# Patient Record
Sex: MALE | Race: WHITE | NOT HISPANIC OR LATINO | Employment: FULL TIME | URBAN - METROPOLITAN AREA
[De-identification: names, ages, dates, MRNs, and addresses within clinical notes are randomized per-mention and may not be internally consistent; named-entity substitution may affect disease eponyms.]

---

## 2023-04-18 PROBLEM — R12 HEARTBURN: Status: ACTIVE | Noted: 2023-04-18

## 2023-04-18 PROBLEM — R06.00 DYSPNEA: Status: ACTIVE | Noted: 2023-04-18

## 2023-04-18 PROBLEM — R53.82 CHRONIC FATIGUE: Status: ACTIVE | Noted: 2023-04-18

## 2023-04-18 PROBLEM — R06.83 SNORING: Status: ACTIVE | Noted: 2023-04-18

## 2023-04-18 PROBLEM — G89.29 CHRONIC LOW BACK PAIN: Status: ACTIVE | Noted: 2023-04-18

## 2023-04-18 PROBLEM — M54.50 CHRONIC LOW BACK PAIN: Status: ACTIVE | Noted: 2023-04-18

## 2023-04-18 PROBLEM — M25.569 CHRONIC KNEE PAIN: Status: ACTIVE | Noted: 2023-04-18

## 2023-04-18 PROBLEM — I10 ESSENTIAL HYPERTENSION: Status: ACTIVE | Noted: 2023-04-18

## 2023-04-18 PROBLEM — G89.29 CHRONIC KNEE PAIN: Status: ACTIVE | Noted: 2023-04-18

## 2023-04-18 RX ORDER — IBUPROFEN 200 MG
200 TABLET ORAL EVERY 6 HOURS PRN
COMMUNITY
End: 2023-04-19

## 2023-04-19 ENCOUNTER — CONSULT (OUTPATIENT)
Dept: BARIATRICS/WEIGHT MGMT | Facility: CLINIC | Age: 28
End: 2023-04-19
Payer: COMMERCIAL

## 2023-04-19 VITALS
TEMPERATURE: 98.1 F | WEIGHT: 311 LBS | DIASTOLIC BLOOD PRESSURE: 92 MMHG | SYSTOLIC BLOOD PRESSURE: 146 MMHG | HEIGHT: 69 IN | HEART RATE: 77 BPM | BODY MASS INDEX: 46.06 KG/M2

## 2023-04-19 DIAGNOSIS — G89.29 CHRONIC PAIN OF BOTH KNEES: ICD-10-CM

## 2023-04-19 DIAGNOSIS — M25.562 CHRONIC PAIN OF BOTH KNEES: ICD-10-CM

## 2023-04-19 DIAGNOSIS — M54.50 CHRONIC BILATERAL LOW BACK PAIN WITHOUT SCIATICA: ICD-10-CM

## 2023-04-19 DIAGNOSIS — R12 HEARTBURN: ICD-10-CM

## 2023-04-19 DIAGNOSIS — R06.83 SNORING: ICD-10-CM

## 2023-04-19 DIAGNOSIS — E66.01 MORBID OBESITY WITH BMI OF 45.0-49.9, ADULT: Primary | ICD-10-CM

## 2023-04-19 DIAGNOSIS — I10 ESSENTIAL HYPERTENSION: ICD-10-CM

## 2023-04-19 DIAGNOSIS — M25.561 CHRONIC PAIN OF BOTH KNEES: ICD-10-CM

## 2023-04-19 DIAGNOSIS — R53.82 CHRONIC FATIGUE: ICD-10-CM

## 2023-04-19 DIAGNOSIS — Z71.3 DIETARY COUNSELING: ICD-10-CM

## 2023-04-19 DIAGNOSIS — Z01.818 PREOPERATIVE TESTING: ICD-10-CM

## 2023-04-19 DIAGNOSIS — G89.29 CHRONIC BILATERAL LOW BACK PAIN WITHOUT SCIATICA: ICD-10-CM

## 2023-04-19 RX ORDER — CALCIUM CARBONATE 200(500)MG
1 TABLET,CHEWABLE ORAL AS NEEDED
COMMUNITY

## 2023-04-19 RX ORDER — NAPROXEN SODIUM 220 MG
220 TABLET ORAL
COMMUNITY
End: 2023-04-19

## 2023-04-19 NOTE — PROGRESS NOTES
"Metabolic and Bariatric Surgery Nutrition Assessment    Patient Name: Mark Keller    YOB: 1995   Age: 27 y.o.  Sex: male  MRN: [id number]     Assessment Date: 04/19/2023    Procedure considering: sleeve    Anthropometric Measurements  Height: 68.58\"          Current weight: 311 lbs   BMI: 46.49 kg/m²    Patient Goals and Expectations                        Patient's stated weight goal: 220-230 lbs  Reasons for desiring weight loss: quality of life, enjoy activities with child     Medical History  Pertinent diagnosis/problems: hypertension, back pain, knee pain     Weight History  Highest adult weight: 311 lbs                              Lowest adult weight: 230 lbs  Onset of obesity: childhood  Possible triggers or life events that may have led to weight gain: always been overweight     Previous Weight Loss Efforts  Patient has tried to lose weight in the past including calorie counting, high protein, low carbohydrate, keto, fasting and exercise.   Patient has lost up to 60 lbs on diets, but was unable to maintain this long term.     Diet History  Patient is eating 2-3 meals and 1-2 snacks daily.     24 Hour Recall  Breakfast: cereal or eggs and nava   Lunch: burger or may snack on chips, peanuts   Dinner: meat, vegetable, starch or fast food  Snacks: popcorn, deli meat, cheese, crackers    Beverages: water, tea, [employer], coffee, occasional soda     Eating out: 3-4 times per week   Vitamins/supplements: none   Diet restrictions or food allergies: NKFA    Patient identifies problem areas to be physical hunger, over consumption, eating large portions, grazing and inactivity.      Physical Activity  Work-related activity:    Planned exercise: none   Barriers to activity: knee pain and easily out of breath     Nutrition Intervention  Pre and post op nutrition education and coaching for behavior change completed. Program materials provided. Emphasized the importance of taking in at " least 70 g protein and 64 oz fluid daily. Discussed personal habits and lifestyle behaviors that may influence weight loss efforts. Self monitoring strategies such as keeping a food journal were encouraged. Patient verbalized understanding and questions were answered.  Short term goals: decrease/eliminate high calorie and carbonated beverages    Recommendations/Plan  Patient will be evaluated by multidisciplinary team before undergoing a review of their candidacy.  Additional nutrition counseling available and encouraged both pre and post op.   Patient demonstrated a good comprehension of diet requirements and commitment to make healthy changes.   Mark Keller appears to be a suitable candidate for bariatric surgery from a nutritional standpoint.      Prerna Gonsales RD  04/19/23  16:18 EDT

## 2023-04-19 NOTE — PROGRESS NOTES
[facility]RIC [facility]GERY  [address] [facility] [address]  [facility] [address]  [phone]  [address] [id number] [phone]  Dept: [phone]  4/19/2023      Mark Keller.  [id number]  [id number]  1995  male      Chief Complaint of weight gain; unable to maintain weight loss    History of Present Illness:   Mark is a 27 y.o. male who presents today for evaluation, education and consultation regarding weight loss surgery. The patient is interested in the sleeve gastrectomy.      Diet History:Mark has been overweight for at least 13 years, has been 35 pounds or more overweight for at least 13 years, has been 100 pounds or more overweight for 8 or more years and started dieting at age 23.  The most weight Mark lost was 70 pounds on Keto, calorie restriction, fasting and maintained the weight loss for a few months. Mark describes his eating habits as snacking between meals, drinking caffeine containing drinks, eating large meals, drinking soda. Mark Keller has tried Fasting, reduced calorie, exercising and Ketogenic or high fat among others with success of losing up to 70 pounds, but in each instance regained the weight.     See dietician documentation for complete history.    Bariatric Surgery Evaluation: The patient is being seen for an initial visit for bariatric surgery evaluation and education.     Bariatric Co-morbidities:  hypertension, back pain, knee pain and GERD    Patient Active Problem List   Diagnosis   • Chronic fatigue   • Essential hypertension   • Snoring   • Dyspnea   • Heartburn   • Chronic low back pain   • Chronic knee pain   • Morbid obesity with BMI of 45.0-49.9, adult   • Dietary counseling   • Preoperative testing       Past Medical History:   Diagnosis Date   • Chronic knee pain    • Chronic low back pain    • Heartburn    • Hypertension        Past Surgical History:   Procedure Laterality Date   •  APPENDECTOMY      open       No Known Allergies      Current Outpatient Medications:   •  calcium carbonate (TUMS) 500 MG chewable tablet, Chew 1 tablet As Needed., Disp: , Rfl:     Social History     Socioeconomic History   • Marital status: Single   Tobacco Use   • Smoking status: Former     Types: Cigarettes   • Smokeless tobacco: Former   Vaping Use   • Vaping Use: Never used   Substance and Sexual Activity   • Alcohol use: Yes     Comment: rare   • Drug use: Never   • Sexual activity: Defer       Family History   Problem Relation Age of Onset   • Obesity Mother    • Hypertension Father    • Heart attack Father    • Heart disease Father    • Obesity Maternal Grandmother    • Sleep apnea Maternal Grandmother    • Heart disease Maternal Grandfather    • Heart attack Maternal Grandfather    • Hypertension Maternal Grandfather    • Heart disease Paternal Grandmother    • Heart attack Paternal Grandmother    • Hypertension Paternal Grandmother    • Obesity Paternal Grandmother    • Diabetes Paternal Grandmother          Review of Systems:  Review of Systems   Constitutional: Positive for fatigue and unexpected weight change.   Respiratory: Negative for chest tightness and shortness of breath.    Cardiovascular: Negative for chest pain.   Gastrointestinal:        Heartburn   Musculoskeletal: Positive for arthralgias, back pain and myalgias.   All other systems reviewed and are negative.      Physical Exam:  Vital Signs:  Weight: (!) 141 kg (311 lb)   Body mass index is 46.49 kg/m².  Temp: 98.1 °F (36.7 °C)   Heart Rate: 77   BP: 146/92     Physical Exam  Vitals reviewed.   Constitutional:       General: He is not in acute distress.     Appearance: Normal appearance. He is morbidly obese.   HENT:      Head: Normocephalic and atraumatic.      Mouth/Throat:      Mouth: Mucous membranes are moist.   Eyes:      General: No scleral icterus.     Extraocular Movements: Extraocular movements intact.      Conjunctiva/sclera:  Conjunctivae normal.      Pupils: Pupils are equal, round, and reactive to light.   Cardiovascular:      Rate and Rhythm: Normal rate and regular rhythm.      Heart sounds: Normal heart sounds. No murmur heard.    No gallop.   Pulmonary:      Effort: Pulmonary effort is normal. No respiratory distress.      Breath sounds: Normal breath sounds.   Abdominal:      General: Bowel sounds are normal. There is no distension.      Palpations: Abdomen is soft. There is no mass.      Tenderness: There is no abdominal tenderness. There is no guarding.   Musculoskeletal:         General: Normal range of motion.      Cervical back: Normal range of motion and neck supple.   Skin:     General: Skin is warm and dry.   Neurological:      General: No focal deficit present.      Mental Status: He is alert and oriented to person, place, and time.   Psychiatric:         Mood and Affect: Mood normal.         Behavior: Behavior normal.         Thought Content: Thought content normal.         Judgment: Judgment normal.       Assessment:         Mark Keller is a 27 y.o. year old male with medically complicated severe obesity. Weight: (!) 141 kg (311 lb), Body mass index is 46.49 kg/m². and weight related problems including hypertension, back pain, knee pain and GERD.    I explained in detail, potential surgical options of interest to the patient including the RNY gastric bypass, sleeve gastrectomy, and gastric band while considering the patient's medical history. At this time, the patient expressed interest in the Laparoscopic Sleeve  All of those procedures can be performed laparoscopically but there is a chance to convert to open if any technical challenges or complications do occur.  Bariatric surgery is not cosmetic surgery but rather a tool to help a patient make a life-long commitment lifestyle changes including diet, exercise, behavior changes, and taking supplemental vitamins and minerals.    Due to the patient's BMI, history,  and co-morbidities related to potential surgical complications were evaluated. Due to Mark Keller's risk factors male will obtain the following prior to being scheduled for surgical intervention:    A letter of medical support as well as a history and physical must be obtained from his primary care provider. The patient was given a copy of a sample form, that will suffice as their letter to take to their primary are provider.    A referral for pre-operative psychological evaluation was ordered for the patient to evaluate candidacy as well as provide mental health support, should it be warranted before or after surgery.    Previous records were not available for review at this time.  Patient requests referral to internal medicine.   and  EKG, CXR, psychology clearance, CBC, CMP, Hba1c and TSHwere ordered at this time. These will be drawn and patient will be notified with results. Patient will complete new, pre-operative radiology prior to being scheduled for surgery.     Mark Keller was screened for sleep apnea in our office today and based on their results he is low risk. The risks, as they relate to chronic hypercapnia r/t untreated SARAHI were discussed with the patient and He verbalized understanding.     A pre-operative diagnostic UGI for evaluation will be ordered and scheduled for this patient.     As this patient is a male who is unable to become pregnant he was not counseled related to conception and pregnancy.     The risks, benefits, alternatives, and potential complications of all of the sleeve gastrectomy explained in detail including, but not limited to death, anesthesia and medication adverse effect/DVT, pulmonary embolism, trocar site/incisional hernia, wound infection, abdominal infection, bleeding, failure to lose weight or gain weight and change in body image, metabolic complications with calcium, thiamine, vitamin B12, folate, iron, and anemia.    The patient was advised to start a high  protein, low fat and low carbohydrate diet  start routine exercise including but not limited to 150 minutes per week. The patient received a resistance band along with a handout of exercises. The patient was given individualized information by our dietician along with handouts.     The patient was given information regarding the ARCENIO educational video. ARCENIO is an internet based educational video which explains the sourgical procedure and answers basic questions regarding the procedure. The patient was provided with instructions and a password to watch the video.    The patient understands the surgical procedures and the different surgical options that are available.  He understands the lifestyle changes that would be required after surgery and has agreed to participate in a pre-operative and postoperative weight management program.  He also expressed understanding of possible risks, had several questions answered and desires to proceed.    I think he is a good candidate for this surgery, and is interested in a sleeve gastrectomy.    Encounter Diagnoses   Name Primary?   • Morbid obesity with BMI of 45.0-49.9, adult Yes   • Essential hypertension    • Heartburn    • Chronic bilateral low back pain without sciatica    • Chronic pain of both knees    • Snoring    • Chronic fatigue    • Dietary counseling    • Preoperative testing        Plan:    Patient will be evaluated by a bariatric dietician psychologist before undergoing a multidisciplinary review of his candidacy. We discussed weight loss requirements prior to surgery and rationale, as well as other program requirements to ensure the safest approach to surgery. We spent time discussing different surgical procedures and plan of care throughout their lifespan to ensure long term success in achieving and maintaining a healthier weight. Patient will proceed with preoperative lab work, radiology, and endoscopy after obtaining agreed upon specialty, clearances, sleep  study, and letter of support from his primary care provider.    Total time spent during this encounter today was 60 minutes and includes preparing for the visit, reviewing tests, performing a medically appropriate examination and/or evaluations, counseling and educating the patient/family/caregiver, ordering medications, tests, or procedures, documenting information in the medical record and independently interpreting results and communicating that information with the patient/family/caregiver.    Tiffany Moreau, APRN  4/19/2023

## 2023-04-25 ENCOUNTER — PATIENT ROUNDING (BHMG ONLY) (OUTPATIENT)
Dept: FAMILY MEDICINE CLINIC | Facility: CLINIC | Age: 28
End: 2023-04-25
Payer: COMMERCIAL

## 2023-04-25 ENCOUNTER — OFFICE VISIT (OUTPATIENT)
Dept: FAMILY MEDICINE CLINIC | Facility: CLINIC | Age: 28
End: 2023-04-25
Payer: COMMERCIAL

## 2023-04-25 VITALS
BODY MASS INDEX: 46.02 KG/M2 | SYSTOLIC BLOOD PRESSURE: 137 MMHG | WEIGHT: 310.7 LBS | OXYGEN SATURATION: 98 % | HEIGHT: 69 IN | DIASTOLIC BLOOD PRESSURE: 98 MMHG | HEART RATE: 72 BPM

## 2023-04-25 DIAGNOSIS — Z00.00 ANNUAL PHYSICAL EXAM: ICD-10-CM

## 2023-04-25 DIAGNOSIS — E66.01 MORBID (SEVERE) OBESITY DUE TO EXCESS CALORIES: Primary | ICD-10-CM

## 2023-04-25 RX ORDER — OMEGA-3 FATTY ACIDS/FISH OIL 300-1000MG
1 CAPSULE ORAL AS NEEDED
COMMUNITY

## 2023-04-25 NOTE — PROGRESS NOTES
"Chief Complaint  Obesity (Setting up care for weight loss surgery)    Subjective        Mark Keller presents to [facility]  History of Present Illness  Establish care with PCP. Is going to have gastric sleeve placement and needs a PCP.   Obesity  This is a chronic problem. The current episode started more than 1 year ago. The problem occurs constantly. The problem has been gradually worsening. Associated symptoms include arthralgias and myalgias (sometimes). Pertinent negatives include no fatigue. Associated symptoms comments: Shortness of breath with exertion. Nothing aggravates the symptoms. Treatments tried: diet, exercise,  The treatment provided no relief.       I have reviewed the patient's medical history in detail and updated the computerized patient record.      Current Outpatient Medications:   •  calcium carbonate (TUMS) 500 MG chewable tablet, Chew 1 tablet As Needed., Disp: , Rfl:   •  Ibuprofen 200 MG capsule, Take 1 dose by mouth As Needed., Disp: , Rfl:        Objective   Vital Signs:  /98 (BP Location: Left arm, Patient Position: Sitting, Cuff Size: Adult)   Pulse 72   Ht 174.4 cm (68.66\")   Wt (!) 141 kg (310 lb 11.2 oz)   SpO2 98%   BMI 46.34 kg/m²   Estimated body mass index is 46.34 kg/m² as calculated from the following:    Height as of this encounter: 174.4 cm (68.66\").    Weight as of this encounter: 141 kg (310 lb 11.2 oz).       Class 3 Severe Obesity (BMI >=40). Obesity-related health conditions include the following: hypertension and arthralgias. Obesity is worsening. BMI is is above average; BMI management plan is completed. We discussed portion control and increasing exercise.      Physical Exam  Vitals reviewed.   Constitutional:       Appearance: Normal appearance.   Cardiovascular:      Rate and Rhythm: Normal rate and regular rhythm.      Pulses: Normal pulses.      Heart sounds: Normal heart sounds.   Pulmonary:      Effort: Pulmonary " effort is normal.      Breath sounds: Normal breath sounds.   Neurological:      Mental Status: He is alert and oriented to person, place, and time.   Psychiatric:      Comments: No acute distress        Result Review :                   Assessment and Plan   Diagnoses and all orders for this visit:    1. Morbid (severe) obesity due to excess calories (Primary)    Mr. Keller presents today to establish care.  He does not appear to be in any acute distress.  His physical exam is unremarkable.  I feel that he will benefit from gastric sleeve placement.          Follow Up   Return in about 6 months (around 10/25/2023), or if symptoms worsen or fail to improve, for Annual physical, Fasting labs 1 week prior to next f/u.  Patient was given instructions and counseling regarding his condition or for health maintenance advice. Please see specific information pulled into the AVS if appropriate.

## 2023-05-31 ENCOUNTER — HOSPITAL ENCOUNTER (OUTPATIENT)
Dept: GENERAL RADIOLOGY | Facility: HOSPITAL | Age: 28
Discharge: HOME | End: 2023-05-31

## 2023-05-31 ENCOUNTER — LAB (OUTPATIENT)
Dept: LAB | Facility: HOSPITAL | Age: 28
End: 2023-05-31

## 2023-05-31 ENCOUNTER — HOSPITAL ENCOUNTER (OUTPATIENT)
Dept: CARDIOLOGY | Facility: HOSPITAL | Age: 28
Discharge: HOME | End: 2023-05-31

## 2023-05-31 DIAGNOSIS — Z01.818 PREOPERATIVE TESTING: ICD-10-CM

## 2023-05-31 DIAGNOSIS — G89.29 CHRONIC BILATERAL LOW BACK PAIN WITHOUT SCIATICA: ICD-10-CM

## 2023-05-31 DIAGNOSIS — R53.82 CHRONIC FATIGUE: ICD-10-CM

## 2023-05-31 DIAGNOSIS — Z71.3 DIETARY COUNSELING: ICD-10-CM

## 2023-05-31 DIAGNOSIS — M25.561 CHRONIC PAIN OF BOTH KNEES: ICD-10-CM

## 2023-05-31 DIAGNOSIS — R06.83 SNORING: ICD-10-CM

## 2023-05-31 DIAGNOSIS — E66.01 MORBID OBESITY WITH BMI OF 45.0-49.9, ADULT: ICD-10-CM

## 2023-05-31 DIAGNOSIS — M25.562 CHRONIC PAIN OF BOTH KNEES: ICD-10-CM

## 2023-05-31 DIAGNOSIS — I10 ESSENTIAL HYPERTENSION: ICD-10-CM

## 2023-05-31 DIAGNOSIS — R12 HEARTBURN: ICD-10-CM

## 2023-05-31 DIAGNOSIS — G89.29 CHRONIC PAIN OF BOTH KNEES: ICD-10-CM

## 2023-05-31 DIAGNOSIS — M54.50 CHRONIC BILATERAL LOW BACK PAIN WITHOUT SCIATICA: ICD-10-CM

## 2023-05-31 DIAGNOSIS — Z00.00 ANNUAL PHYSICAL EXAM: ICD-10-CM

## 2023-05-31 LAB
ALBUMIN SERPL-MCNC: 5 G/DL (ref 3.5–5.2)
ALBUMIN/GLOB SERPL: 1.7 G/DL
ALP SERPL-CCNC: 62 U/L (ref 39–117)
ALT SERPL W P-5'-P-CCNC: 58 U/L (ref 1–41)
ANION GAP SERPL CALCULATED.3IONS-SCNC: 12.8 MMOL/L (ref 5–15)
AST SERPL-CCNC: 37 U/L (ref 1–40)
BASOPHILS # BLD AUTO: 0.07 10*3/MM3 (ref 0–0.2)
BASOPHILS NFR BLD AUTO: 1.1 % (ref 0–1.5)
BILIRUB SERPL-MCNC: 0.7 MG/DL (ref 0–1.2)
BUN SERPL-MCNC: 20 MG/DL (ref 6–20)
BUN/CREAT SERPL: 22.7 (ref 7–25)
CALCIUM SPEC-SCNC: 10 MG/DL (ref 8.6–10.5)
CHLORIDE SERPL-SCNC: 101 MMOL/L (ref 98–107)
CO2 SERPL-SCNC: 22.2 MMOL/L (ref 22–29)
CREAT SERPL-MCNC: 0.88 MG/DL (ref 0.76–1.27)
DEPRECATED RDW RBC AUTO: 41.8 FL (ref 37–54)
EGFRCR SERPLBLD CKD-EPI 2021: 120.1 ML/MIN/1.73
EOSINOPHIL # BLD AUTO: 0.36 10*3/MM3 (ref 0–0.4)
EOSINOPHIL NFR BLD AUTO: 5.5 % (ref 0.3–6.2)
ERYTHROCYTE [DISTWIDTH] IN BLOOD BY AUTOMATED COUNT: 13.9 % (ref 12.3–15.4)
GLOBULIN UR ELPH-MCNC: 2.9 GM/DL
GLUCOSE SERPL-MCNC: 92 MG/DL (ref 65–99)
HBA1C MFR BLD: 5.4 % (ref 4.8–5.6)
HCT VFR BLD AUTO: 49.2 % (ref 37.5–51)
HGB BLD-MCNC: 15.9 G/DL (ref 13–17.7)
IMM GRANULOCYTES # BLD AUTO: 0.03 10*3/MM3 (ref 0–0.05)
IMM GRANULOCYTES NFR BLD AUTO: 0.5 % (ref 0–0.5)
LYMPHOCYTES # BLD AUTO: 1.73 10*3/MM3 (ref 0.7–3.1)
LYMPHOCYTES NFR BLD AUTO: 26.5 % (ref 19.6–45.3)
MCH RBC QN AUTO: 26.6 PG (ref 26.6–33)
MCHC RBC AUTO-ENTMCNC: 32.3 G/DL (ref 31.5–35.7)
MCV RBC AUTO: 82.4 FL (ref 79–97)
MONOCYTES # BLD AUTO: 0.75 10*3/MM3 (ref 0.1–0.9)
MONOCYTES NFR BLD AUTO: 11.5 % (ref 5–12)
NEUTROPHILS NFR BLD AUTO: 3.6 10*3/MM3 (ref 1.7–7)
NEUTROPHILS NFR BLD AUTO: 54.9 % (ref 42.7–76)
NRBC BLD AUTO-RTO: 0 /100 WBC (ref 0–0.2)
PLATELET # BLD AUTO: 290 10*3/MM3 (ref 140–450)
PMV BLD AUTO: 9 FL (ref 6–12)
POTASSIUM SERPL-SCNC: 4.2 MMOL/L (ref 3.5–5.2)
PROT SERPL-MCNC: 7.9 G/DL (ref 6–8.5)
QT INTERVAL: 399 MS
RBC # BLD AUTO: 5.97 10*6/MM3 (ref 4.14–5.8)
SODIUM SERPL-SCNC: 136 MMOL/L (ref 136–145)
TSH SERPL DL<=0.05 MIU/L-ACNC: 2.14 UIU/ML (ref 0.27–4.2)
WBC NRBC COR # BLD: 6.54 10*3/MM3 (ref 3.4–10.8)

## 2023-05-31 PROCEDURE — 83036 HEMOGLOBIN GLYCOSYLATED A1C: CPT

## 2023-05-31 PROCEDURE — 93005 ELECTROCARDIOGRAM TRACING: CPT | Performed by: NURSE PRACTITIONER

## 2023-05-31 PROCEDURE — 80050 GENERAL HEALTH PANEL: CPT

## 2023-05-31 PROCEDURE — 83013 H PYLORI (C-13) BREATH: CPT | Performed by: NURSE PRACTITIONER

## 2023-05-31 PROCEDURE — 74246 X-RAY XM UPR GI TRC 2CNTRST: CPT

## 2023-05-31 PROCEDURE — 71046 X-RAY EXAM CHEST 2 VIEWS: CPT

## 2023-05-31 PROCEDURE — 36415 COLL VENOUS BLD VENIPUNCTURE: CPT

## 2023-05-31 RX ADMIN — BARIUM SULFATE 135 ML: 980 POWDER, FOR SUSPENSION ORAL at 09:30

## 2023-05-31 RX ADMIN — ANTACID/ANTIFLATULENT 1 PACKET: 380; 550; 10; 10 GRANULE, EFFERVESCENT ORAL at 09:30

## 2023-06-01 LAB — UREA BREATH TEST QL: NEGATIVE

## 2023-11-07 ENCOUNTER — TELEPHONE (OUTPATIENT)
Dept: BARIATRICS/WEIGHT MGMT | Facility: CLINIC | Age: 28
End: 2023-11-07
Payer: COMMERCIAL

## 2023-11-16 ENCOUNTER — TELEPHONE (OUTPATIENT)
Dept: BARIATRICS/WEIGHT MGMT | Facility: CLINIC | Age: 28
End: 2023-11-16
Payer: COMMERCIAL

## 2023-11-16 NOTE — TELEPHONE ENCOUNTER
Called and spoke to patient for update on his case and the approval process. Original request was denied but appeal has been sent to [facility] and we are waiting for a determination. I informed the patient that I would touch base with him as soon as I hear anything more about his case and apologized for the issues he has had getting in touch with our office. Patient had no additional questions at this time but was encouraged to contact me if he needed anything.

## 2023-12-21 ENCOUNTER — PREP FOR SURGERY (OUTPATIENT)
Dept: OTHER | Facility: HOSPITAL | Age: 28
End: 2023-12-21
Payer: COMMERCIAL

## 2023-12-21 DIAGNOSIS — E66.01 OBESITY, CLASS III, BMI 40-49.9 (MORBID OBESITY): Primary | ICD-10-CM

## 2023-12-21 PROBLEM — E66.813 OBESITY, CLASS III, BMI 40-49.9 (MORBID OBESITY): Status: ACTIVE | Noted: 2023-12-21

## 2023-12-21 RX ORDER — SCOLOPAMINE TRANSDERMAL SYSTEM 1 MG/1
1 PATCH, EXTENDED RELEASE TRANSDERMAL CONTINUOUS
OUTPATIENT
Start: 2023-12-21 | End: 2023-12-24

## 2023-12-21 RX ORDER — SODIUM CHLORIDE 0.9 % (FLUSH) 0.9 %
3 SYRINGE (ML) INJECTION EVERY 12 HOURS SCHEDULED
OUTPATIENT
Start: 2023-12-21

## 2023-12-21 RX ORDER — GABAPENTIN 300 MG/1
300 CAPSULE ORAL ONCE
OUTPATIENT
Start: 2023-12-21 | End: 2023-12-21

## 2023-12-21 RX ORDER — CHLORHEXIDINE GLUCONATE ORAL RINSE 1.2 MG/ML
15 SOLUTION DENTAL SEE ADMIN INSTRUCTIONS
OUTPATIENT
Start: 2023-12-21

## 2023-12-21 RX ORDER — PROMETHAZINE HYDROCHLORIDE 25 MG/1
25 SUPPOSITORY RECTAL ONCE AS NEEDED
OUTPATIENT
Start: 2023-12-21

## 2023-12-21 RX ORDER — PANTOPRAZOLE SODIUM 40 MG/10ML
40 INJECTION, POWDER, LYOPHILIZED, FOR SOLUTION INTRAVENOUS ONCE
OUTPATIENT
Start: 2023-12-21 | End: 2023-12-21

## 2023-12-21 RX ORDER — CEFAZOLIN SODIUM IN 0.9 % NACL 3 G/100 ML
3 INTRAVENOUS SOLUTION, PIGGYBACK (ML) INTRAVENOUS ONCE
OUTPATIENT
Start: 2023-12-21 | End: 2023-12-21

## 2023-12-21 RX ORDER — SODIUM CHLORIDE 9 MG/ML
40 INJECTION, SOLUTION INTRAVENOUS AS NEEDED
OUTPATIENT
Start: 2023-12-21

## 2023-12-21 RX ORDER — SODIUM CHLORIDE 0.9 % (FLUSH) 0.9 %
3-10 SYRINGE (ML) INJECTION AS NEEDED
OUTPATIENT
Start: 2023-12-21

## 2023-12-21 RX ORDER — METOCLOPRAMIDE HYDROCHLORIDE 5 MG/ML
10 INJECTION INTRAMUSCULAR; INTRAVENOUS ONCE
OUTPATIENT
Start: 2023-12-21 | End: 2023-12-21

## 2023-12-21 RX ORDER — PROMETHAZINE HYDROCHLORIDE 12.5 MG/1
12.5 TABLET ORAL ONCE AS NEEDED
OUTPATIENT
Start: 2023-12-21

## 2023-12-21 RX ORDER — SODIUM CHLORIDE, SODIUM LACTATE, POTASSIUM CHLORIDE, CALCIUM CHLORIDE 600; 310; 30; 20 MG/100ML; MG/100ML; MG/100ML; MG/100ML
100 INJECTION, SOLUTION INTRAVENOUS CONTINUOUS
OUTPATIENT
Start: 2023-12-21

## 2023-12-28 ENCOUNTER — LAB (OUTPATIENT)
Dept: LAB | Facility: HOSPITAL | Age: 28
End: 2023-12-28
Payer: COMMERCIAL

## 2023-12-28 ENCOUNTER — CONSULT (OUTPATIENT)
Dept: BARIATRICS/WEIGHT MGMT | Facility: CLINIC | Age: 28
End: 2023-12-28
Payer: COMMERCIAL

## 2023-12-28 VITALS
HEIGHT: 69 IN | DIASTOLIC BLOOD PRESSURE: 90 MMHG | WEIGHT: 311 LBS | TEMPERATURE: 98 F | HEART RATE: 80 BPM | BODY MASS INDEX: 46.06 KG/M2 | SYSTOLIC BLOOD PRESSURE: 143 MMHG

## 2023-12-28 DIAGNOSIS — M54.50 CHRONIC BILATERAL LOW BACK PAIN, UNSPECIFIED WHETHER SCIATICA PRESENT: ICD-10-CM

## 2023-12-28 DIAGNOSIS — E66.01 OBESITY, CLASS III, BMI 40-49.9 (MORBID OBESITY): Primary | ICD-10-CM

## 2023-12-28 DIAGNOSIS — G89.29 CHRONIC BILATERAL LOW BACK PAIN, UNSPECIFIED WHETHER SCIATICA PRESENT: ICD-10-CM

## 2023-12-28 DIAGNOSIS — E66.01 OBESITY, CLASS III, BMI 40-49.9 (MORBID OBESITY): ICD-10-CM

## 2023-12-28 DIAGNOSIS — I10 ESSENTIAL HYPERTENSION: ICD-10-CM

## 2023-12-28 DIAGNOSIS — R53.82 CHRONIC FATIGUE: ICD-10-CM

## 2023-12-28 DIAGNOSIS — G89.29 CHRONIC KNEE PAIN, UNSPECIFIED LATERALITY: ICD-10-CM

## 2023-12-28 DIAGNOSIS — M25.569 CHRONIC KNEE PAIN, UNSPECIFIED LATERALITY: ICD-10-CM

## 2023-12-28 DIAGNOSIS — Z71.3 DIETARY COUNSELING: ICD-10-CM

## 2023-12-28 PROBLEM — Z01.818 PREOPERATIVE TESTING: Status: RESOLVED | Noted: 2023-04-19 | Resolved: 2023-12-28

## 2023-12-28 LAB
ALBUMIN SERPL-MCNC: 5 G/DL (ref 3.5–5.2)
ALBUMIN/GLOB SERPL: 2.1 G/DL
ALP SERPL-CCNC: 64 U/L (ref 39–117)
ALT SERPL W P-5'-P-CCNC: 46 U/L (ref 1–41)
ANION GAP SERPL CALCULATED.3IONS-SCNC: 11 MMOL/L (ref 5–15)
AST SERPL-CCNC: 34 U/L (ref 1–40)
BILIRUB SERPL-MCNC: 0.4 MG/DL (ref 0–1.2)
BUN SERPL-MCNC: 18 MG/DL (ref 6–20)
BUN/CREAT SERPL: 20.9 (ref 7–25)
CALCIUM SPEC-SCNC: 9.9 MG/DL (ref 8.6–10.5)
CHLORIDE SERPL-SCNC: 102 MMOL/L (ref 98–107)
CO2 SERPL-SCNC: 25 MMOL/L (ref 22–29)
CREAT SERPL-MCNC: 0.86 MG/DL (ref 0.76–1.27)
DEPRECATED RDW RBC AUTO: 40.9 FL (ref 37–54)
EGFRCR SERPLBLD CKD-EPI 2021: 120.2 ML/MIN/1.73
ERYTHROCYTE [DISTWIDTH] IN BLOOD BY AUTOMATED COUNT: 13.6 % (ref 12.3–15.4)
GLOBULIN UR ELPH-MCNC: 2.4 GM/DL
GLUCOSE SERPL-MCNC: 90 MG/DL (ref 65–99)
HCT VFR BLD AUTO: 48.1 % (ref 37.5–51)
HGB BLD-MCNC: 15.7 G/DL (ref 13–17.7)
MCH RBC QN AUTO: 26.9 PG (ref 26.6–33)
MCHC RBC AUTO-ENTMCNC: 32.6 G/DL (ref 31.5–35.7)
MCV RBC AUTO: 82.4 FL (ref 79–97)
PLATELET # BLD AUTO: 306 10*3/MM3 (ref 140–450)
PMV BLD AUTO: 8.9 FL (ref 6–12)
POTASSIUM SERPL-SCNC: 4.9 MMOL/L (ref 3.5–5.2)
PROT SERPL-MCNC: 7.4 G/DL (ref 6–8.5)
RBC # BLD AUTO: 5.84 10*6/MM3 (ref 4.14–5.8)
SODIUM SERPL-SCNC: 138 MMOL/L (ref 136–145)
WBC NRBC COR # BLD AUTO: 7.23 10*3/MM3 (ref 3.4–10.8)

## 2023-12-28 PROCEDURE — 80053 COMPREHEN METABOLIC PANEL: CPT

## 2023-12-28 PROCEDURE — 85027 COMPLETE CBC AUTOMATED: CPT

## 2023-12-28 PROCEDURE — 36415 COLL VENOUS BLD VENIPUNCTURE: CPT

## 2023-12-28 RX ORDER — URSODIOL 300 MG/1
300 CAPSULE ORAL 2 TIMES DAILY
Qty: 60 CAPSULE | Refills: 5 | Status: SHIPPED | OUTPATIENT
Start: 2023-12-28

## 2023-12-28 RX ORDER — FLUTICASONE PROPIONATE 50 MCG
1 SPRAY, SUSPENSION (ML) NASAL DAILY
COMMUNITY
Start: 2023-11-27

## 2023-12-28 RX ORDER — ENOXAPARIN SODIUM 100 MG/ML
40 INJECTION SUBCUTANEOUS EVERY 12 HOURS SCHEDULED
Qty: 11.2 ML | Refills: 0 | Status: SHIPPED | OUTPATIENT
Start: 2023-12-28 | End: 2024-01-11

## 2023-12-28 NOTE — PATIENT INSTRUCTIONS
Bariatric Manual    You were provided a manual specific to the procedure that you have chosen.  Please refer to that with any questions or call the office at [phone]

## 2023-12-28 NOTE — H&P
Bariatric Consult:  Referred by Aracelis Santiago APRN    Mark Keller is here today for consult on Consult (Consult sleeve)      History of Present Illness:     Mark Keller is a 28 y.o. male with morbid obesity with co-morbidities including back pain and knee pain who presents for surgical consultation for the above procedure. Mark has completed the initial intake visit and has been examined by our nurse practitioner, dietician, psychologist and underwent the extensive educational teaching process under the guidance of our bariatric coordinator and myself. Mark also has seen or if has not yet will see the educational video [facility] on the surgical procedure if available. Mark attended today more educational teaching from our bariatric coordinator and myself. Mark has had an extensive medical workup including a visit with their primary care physician, EKG, chest radiograph, blood work, EGD or UGI and possibly further testing. These have been reviewed by me and discussed with the patient. Mark is now ready to proceed with surgery. Mark presently denies nausea, vomiting, fever, chills, chest pain, shortness of air, melena, hematochezia, hemetemesis, dysuria, frequency, hematuria.     Past Medical History:   Diagnosis Date    Chronic knee pain     Chronic low back pain     Heartburn     Hypertension        Encounter Diagnoses   Name Primary?    Obesity, Class III, BMI 40-49.9 (morbid obesity) Yes    Dietary counseling     Essential hypertension     Chronic fatigue     Chronic knee pain, unspecified laterality     Chronic bilateral low back pain, unspecified whether sciatica present        Past Surgical History:   Procedure Laterality Date    APPENDECTOMY      open         * No active hospital problems. *      Allergies   Allergen Reactions    Bee Venom Swelling         Current Outpatient Medications:     calcium carbonate (TUMS) 500 MG chewable tablet, Chew 1 tablet As Needed., Disp: , Rfl:     fluticasone  (FLONASE) 50 MCG/ACT nasal spray, 1 spray into the nostril(s) as directed by provider Daily., Disp: , Rfl:     Enoxaparin Sodium (LOVENOX) 40 MG/0.4ML solution prefilled syringe syringe, Inject 0.4 mL under the skin into the appropriate area as directed Every 12 (Twelve) Hours for 14 days. Start after surgery unless instructed otherwise, Disp: 11.2 mL, Rfl: 0    folic acid-vit B6-vit B12 (FOLBEE) 2.5-25-1 MG tablet tablet, Take 1 tablet by mouth Daily., Disp: 40 tablet, Rfl: 0    ursodiol (Actigall) 300 MG capsule, Take 1 capsule by mouth 2 (Two) Times a Day., Disp: 60 capsule, Rfl: 5    Social History     Socioeconomic History    Marital status: Single   Tobacco Use    Smoking status: Former     Packs/day: 1.00     Years: 1.00     Additional pack years: 0.00     Total pack years: 1.00     Types: Cigarettes     Start date: 2009     Quit date: 2010     Years since quittin.7    Smokeless tobacco: Former   Vaping Use    Vaping Use: Never used   Substance and Sexual Activity    Alcohol use: Yes     Comment: rare    Drug use: Never    Sexual activity: Defer       Family History   Problem Relation Age of Onset    Obesity Mother     Hypertension Father     Heart attack Father     Heart disease Father     Obesity Maternal Grandmother     Sleep apnea Maternal Grandmother     Heart disease Maternal Grandfather     Heart attack Maternal Grandfather     Hypertension Maternal Grandfather     Heart disease Paternal Grandmother     Heart attack Paternal Grandmother     Hypertension Paternal Grandmother     Obesity Paternal Grandmother     Diabetes Paternal Grandmother        Review of Systems:  Review of Systems   Constitutional:  Positive for fatigue.   Musculoskeletal:  Positive for arthralgias and back pain.   All other systems reviewed and are negative.      Physical Exam:  Vital Signs:  Weight: (!) 141 kg (311 lb)   Body mass index is 46.49 kg/m².  Temp: 98 °F (36.7 °C)   Heart Rate: 80   BP: 143/90     Physical  Exam  Vitals reviewed.   HENT:      Head: Normocephalic and atraumatic.      Mouth/Throat:      Mouth: Mucous membranes are moist.      Pharynx: Oropharynx is clear.   Eyes:      General: No scleral icterus.     Extraocular Movements: Extraocular movements intact.      Conjunctiva/sclera: Conjunctivae normal.      Pupils: Pupils are equal, round, and reactive to light.   Neck:      Thyroid: No thyromegaly.   Cardiovascular:      Rate and Rhythm: Normal rate.   Pulmonary:      Effort: Pulmonary effort is normal. No respiratory distress.      Breath sounds: Normal breath sounds. No stridor. No wheezing or rhonchi.   Abdominal:      General: Bowel sounds are normal.      Palpations: Abdomen is soft.      Tenderness: There is no abdominal tenderness. There is no right CVA tenderness, left CVA tenderness, guarding or rebound.      Hernia: No hernia is present.   Musculoskeletal:         General: Normal range of motion.      Cervical back: Normal range of motion and neck supple.   Lymphadenopathy:      Cervical: No cervical adenopathy.   Skin:     General: Skin is warm and dry.      Findings: No erythema.   Neurological:      Mental Status: He is alert and oriented to person, place, and time.   Psychiatric:         Mood and Affect: Mood normal.         Behavior: Behavior normal.         Thought Content: Thought content normal.         Judgment: Judgment normal.         Assessment:    Mark Keller is a 28 y.o. year old male with medically complicated severe obesity with a BMI of Body mass index is 46.49 kg/m². and multiple co-morbidities listed in the encounter diagnosis.    I think he is an appropriate candidate for this surgery, and is ready to proceed.    Plan/Discussion/Summary:  No hiatal hernia per upper GI.  No PPI.  H. pylori negative    The patient has returned to the office for a surgical consultation and has requested to proceed with gastric sleeve.  I have had the opportunity to obtain a history, examine  the patient and review the patient's chart. The procedure could be done laparoscopically and or robotically.    The patient understands that surgery is a tool and that weight loss is not guaranteed but only seen in the context of appropriate use, regular follow up, exercise and making appropriate food choices.     I personally discussed the potential complications of the laparoscopic gastric sleeve with this patient.  The patient is well aware of potential complications of the surgery that include but not limited to bleeding, infections, deep vein thrombosis, pulmonary embolism, pulmonary complications such as pneumonia, cardiac event, hernias, small bowel obstruction, damage to the spleen or other organs, bowel injury, disfiguring scars, failure to lose weight, need for additional surgery, conversion to an open procedure and death.  The patient is also aware of complications which apply in particular to the gastric sleeve and can include but not limited to the leakage of gastric contents at the staple line, the development of an intra-abdominal abscess, gastroesophageal reflux disease, Parnell's esophagus, ulcers, vitamin/mineral deficiencies, strictures, and the possibility of converting this procedure to a Toyin-en-Y gastric bypass. The patient also understands the possibility of requiring an acid reducer medication for the rest of their life.    The risks, benefits, potential complications and alternative therapies were discussed at great length as outlined in our extensive consent forms, online consent and educational teaching processes.    The patient has confirmed the participation in the programs extensive educational activities.    All questions and concerns were answered to patient's satisfaction.  The patient now wishes to proceed with surgery.     The patient has agreed to a postoperative course of anitcoagulant therapy.      I instructed patient that the surgery could be laparoscopically and/or  robotically.    I instructed patient to start on a H2 blocker or proton pump inhibitor if not already on one of these medications.    I explained in detail the procedures that are in the consent.  All of these procedures have a chance to convert to open if any technical challenges or complications do occur.  Bariatric surgery is not cosmetic surgery but rather a tool to help a patient make a life-long commitment lifestyle change including diet, exercise, behavior changes, and taking supplemental vitamins and minerals.    Problems after surgery may require more operations to correct them.    The risks, benefits, alternatives, and potential complications of all of the procedures were explained in detail including, but not limited to death, anesthesia and medication adverse effect, deep venous thrombosis, pulmonary embolism, trocar site/incisional hernia, wound infection, abdominal infection, bleeding, failure to lose weight, gain weight, a change in body image, metabolic complications with vitamin deficiences and anemia.    Weight loss expectations were discussed with the patient in detail. The weight loss operations most commonly performed are the sleeve gastrectomy and the Toyin-en-Y gastric bypass. These operations result in weight losses up to approximately 25-35% of initial body weight 12 to 24 months after surgery with the gastric bypass usually the higher percent of weight loss but depends on patient using the tool.    For the gastric bypass and loop duodenal switch (LANDEN-S) the risks include but not limited to the following early complications:  Anastomotic leak/peritonitis, Toyin/Alimentary/biliopancreatic limb obstruction, severe & minor wound infection/seroma, and nausea/vomiting.  Late complications can include but are not limited to malnutrition, vitamin deficiencies, frequent loose stools,  stomal stenosis, marginal ulcer, bowel obstruction, intussusception, internal, and incisional hernia.    Regarding  the gastric sleeve, there is higher risk of dysphagia and reflux leading to possible Parnell's esophagus compared to a gastric bypass, as well as risk of internal visceral/organ injury, splenectomy, bleeding, infection, leak (which could require further intervention possible conversion to Toyin-en-Y gastric bypass), stenosis and possibility of regaining weight.    Mark was counseled regarding diagnostic results, instructions for management, risk factor reductions, prognosis, patient and family education, impressions, risks and benefits of treatment options and importance of compliance with treatment. Total time of the encounter was over 45 minutes counseling the patient regarding the procedure as above and reviewing as well as ordering labs, medications and the procedure.  The chart was also reviewed prior to seeing the patient reviewing previous testing, studies and labs.    Mark understands the surgical procedures and the different surgical options that are available.  He understands the lifestyle changes that are required after surgery and has agreed to follow the guidelines outlined in the weight management program.  He also expressed understanding of the risks involved and had all of male questions answered and desires to proceed.      Kemal Munson MD  12/28/2023

## 2024-01-10 ENCOUNTER — ANESTHESIA EVENT (OUTPATIENT)
Dept: PERIOP | Facility: HOSPITAL | Age: 29
End: 2024-01-10
Payer: COMMERCIAL

## 2024-01-10 ENCOUNTER — ANESTHESIA (OUTPATIENT)
Dept: PERIOP | Facility: HOSPITAL | Age: 29
End: 2024-01-10
Payer: COMMERCIAL

## 2024-01-10 ENCOUNTER — HOSPITAL ENCOUNTER (INPATIENT)
Facility: HOSPITAL | Age: 29
LOS: 1 days | Discharge: HOME | End: 2024-01-10
Attending: SURGERY | Admitting: SURGERY
Payer: COMMERCIAL

## 2024-01-10 VITALS
RESPIRATION RATE: 16 BRPM | TEMPERATURE: 97.6 F | OXYGEN SATURATION: 95 % | DIASTOLIC BLOOD PRESSURE: 72 MMHG | HEART RATE: 65 BPM | SYSTOLIC BLOOD PRESSURE: 114 MMHG

## 2024-01-10 DIAGNOSIS — Z71.3 DIETARY COUNSELING: Primary | ICD-10-CM

## 2024-01-10 DIAGNOSIS — I10 ESSENTIAL HYPERTENSION: ICD-10-CM

## 2024-01-10 DIAGNOSIS — R12 HEARTBURN: ICD-10-CM

## 2024-01-10 DIAGNOSIS — R53.82 CHRONIC FATIGUE: ICD-10-CM

## 2024-01-10 DIAGNOSIS — M25.569 CHRONIC KNEE PAIN, UNSPECIFIED LATERALITY: ICD-10-CM

## 2024-01-10 DIAGNOSIS — G89.29 CHRONIC KNEE PAIN, UNSPECIFIED LATERALITY: ICD-10-CM

## 2024-01-10 DIAGNOSIS — R06.83 SNORING: ICD-10-CM

## 2024-01-10 DIAGNOSIS — E66.01 OBESITY, CLASS III, BMI 40-49.9 (MORBID OBESITY): ICD-10-CM

## 2024-01-10 LAB
ALT SERPL W P-5'-P-CCNC: 59 U/L (ref 1–41)
HBV SURFACE AB SER RIA-ACNC: NORMAL
HBV SURFACE AG SERPL QL IA: NORMAL
HCV AB SER DONR QL: NORMAL
HIV 1+2 AB+HIV1 P24 AG SERPL QL IA: NORMAL

## 2024-01-10 PROCEDURE — 25810000003 LACTATED RINGERS SOLUTION: Performed by: SURGERY

## 2024-01-10 PROCEDURE — 43775 LAP SLEEVE GASTRECTOMY: CPT | Performed by: SURGERY

## 2024-01-10 PROCEDURE — 25010000002 ONDANSETRON PER 1 MG

## 2024-01-10 PROCEDURE — 86706 HEP B SURFACE ANTIBODY: CPT | Performed by: SURGERY

## 2024-01-10 PROCEDURE — 25010000002 THIAMINE PER 100 MG: Performed by: SURGERY

## 2024-01-10 PROCEDURE — 25010000002 EPINEPHRINE 1 MG/ML SOLUTION 30 ML VIAL: Performed by: SURGERY

## 2024-01-10 PROCEDURE — 88307 TISSUE EXAM BY PATHOLOGIST: CPT | Performed by: SURGERY

## 2024-01-10 PROCEDURE — 25010000002 PROPOFOL 10 MG/ML EMULSION

## 2024-01-10 PROCEDURE — 25010000002 ROPIVACAINE PER 1 MG: Performed by: SURGERY

## 2024-01-10 PROCEDURE — 25010000002 ENOXAPARIN PER 10 MG: Performed by: SURGERY

## 2024-01-10 PROCEDURE — 25010000002 ACETAMINOPHEN 10 MG/ML SOLUTION

## 2024-01-10 PROCEDURE — 25010000002 MAGNESIUM SULFATE PER 500 MG OF MAGNESIUM

## 2024-01-10 PROCEDURE — 25010000002 CLONIDINE PER 1 MG: Performed by: SURGERY

## 2024-01-10 PROCEDURE — 25010000002 SUGAMMADEX 200 MG/2ML SOLUTION

## 2024-01-10 PROCEDURE — 25010000002 CEFAZOLIN PER 500 MG: Performed by: SURGERY

## 2024-01-10 PROCEDURE — 25810000003 LACTATED RINGERS PER 1000 ML: Performed by: SURGERY

## 2024-01-10 PROCEDURE — G0432 EIA HIV-1/HIV-2 SCREEN: HCPCS | Performed by: SURGERY

## 2024-01-10 PROCEDURE — 25010000002 KETOROLAC TROMETHAMINE PER 15 MG: Performed by: SURGERY

## 2024-01-10 PROCEDURE — 25010000002 FENTANYL CITRATE (PF) 50 MCG/ML SOLUTION

## 2024-01-10 PROCEDURE — 8E0W4CZ ROBOTIC ASSISTED PROCEDURE OF TRUNK REGION, PERCUTANEOUS ENDOSCOPIC APPROACH: ICD-10-PCS | Performed by: SURGERY

## 2024-01-10 PROCEDURE — 25010000002 PROPOFOL 200 MG/20ML EMULSION

## 2024-01-10 PROCEDURE — 25010000002 METOCLOPRAMIDE PER 10 MG: Performed by: SURGERY

## 2024-01-10 PROCEDURE — 84460 ALANINE AMINO (ALT) (SGPT): CPT | Performed by: SURGERY

## 2024-01-10 PROCEDURE — 25010000002 DEXAMETHASONE SODIUM PHOSPHATE 20 MG/5ML SOLUTION

## 2024-01-10 PROCEDURE — 86803 HEPATITIS C AB TEST: CPT | Performed by: SURGERY

## 2024-01-10 PROCEDURE — 87340 HEPATITIS B SURFACE AG IA: CPT | Performed by: SURGERY

## 2024-01-10 PROCEDURE — 43775 LAP SLEEVE GASTRECTOMY: CPT | Performed by: NURSE PRACTITIONER

## 2024-01-10 PROCEDURE — 25010000002 CYANOCOBALAMIN PER 1000 MCG: Performed by: SURGERY

## 2024-01-10 PROCEDURE — 0DB64Z3 EXCISION OF STOMACH, PERCUTANEOUS ENDOSCOPIC APPROACH, VERTICAL: ICD-10-PCS | Performed by: SURGERY

## 2024-01-10 PROCEDURE — 25010000002 FENTANYL CITRATE (PF) 100 MCG/2ML SOLUTION

## 2024-01-10 PROCEDURE — 25010000002 GLYCOPYRROLATE 0.2 MG/ML SOLUTION

## 2024-01-10 DEVICE — IMPLANTABLE DEVICE
Type: IMPLANTABLE DEVICE | Site: ABDOMEN | Status: FUNCTIONAL
Brand: TITAN SGS STANDARD GASTRIC STAPLER

## 2024-01-10 RX ORDER — DEXAMETHASONE SODIUM PHOSPHATE 4 MG/ML
INJECTION, SOLUTION INTRA-ARTICULAR; INTRALESIONAL; INTRAMUSCULAR; INTRAVENOUS; SOFT TISSUE AS NEEDED
Status: DISCONTINUED | OUTPATIENT
Start: 2024-01-10 | End: 2024-01-10 | Stop reason: SURG

## 2024-01-10 RX ORDER — GLYCOPYRROLATE 0.2 MG/ML
INJECTION INTRAMUSCULAR; INTRAVENOUS AS NEEDED
Status: DISCONTINUED | OUTPATIENT
Start: 2024-01-10 | End: 2024-01-10 | Stop reason: SURG

## 2024-01-10 RX ORDER — PROMETHAZINE HYDROCHLORIDE 25 MG/1
12.5 TABLET ORAL ONCE AS NEEDED
Status: DISCONTINUED | OUTPATIENT
Start: 2024-01-10 | End: 2024-01-10 | Stop reason: HOSPADM

## 2024-01-10 RX ORDER — SODIUM CHLORIDE 0.9 % (FLUSH) 0.9 %
3 SYRINGE (ML) INJECTION EVERY 12 HOURS SCHEDULED
Status: DISCONTINUED | OUTPATIENT
Start: 2024-01-10 | End: 2024-01-10 | Stop reason: HOSPADM

## 2024-01-10 RX ORDER — PROMETHAZINE HYDROCHLORIDE 25 MG/1
25 TABLET ORAL ONCE AS NEEDED
Status: DISCONTINUED | OUTPATIENT
Start: 2024-01-10 | End: 2024-01-10 | Stop reason: HOSPADM

## 2024-01-10 RX ORDER — ONDANSETRON 4 MG/1
4 TABLET, ORALLY DISINTEGRATING ORAL EVERY 4 HOURS PRN
Qty: 30 TABLET | Refills: 0 | Status: SHIPPED | OUTPATIENT
Start: 2024-01-10

## 2024-01-10 RX ORDER — HYDROCODONE BITARTRATE AND ACETAMINOPHEN 5; 325 MG/1; MG/1
1 TABLET ORAL ONCE AS NEEDED
Status: DISCONTINUED | OUTPATIENT
Start: 2024-01-10 | End: 2024-01-10 | Stop reason: HOSPADM

## 2024-01-10 RX ORDER — DROPERIDOL 2.5 MG/ML
0.62 INJECTION, SOLUTION INTRAMUSCULAR; INTRAVENOUS
Status: DISCONTINUED | OUTPATIENT
Start: 2024-01-10 | End: 2024-01-10 | Stop reason: HOSPADM

## 2024-01-10 RX ORDER — ONDANSETRON 2 MG/ML
INJECTION INTRAMUSCULAR; INTRAVENOUS AS NEEDED
Status: DISCONTINUED | OUTPATIENT
Start: 2024-01-10 | End: 2024-01-10 | Stop reason: SURG

## 2024-01-10 RX ORDER — PROMETHAZINE HYDROCHLORIDE 25 MG/1
25 SUPPOSITORY RECTAL ONCE AS NEEDED
Status: DISCONTINUED | OUTPATIENT
Start: 2024-01-10 | End: 2024-01-10 | Stop reason: HOSPADM

## 2024-01-10 RX ORDER — CEFAZOLIN SODIUM IN 0.9 % NACL 3 G/100 ML
3 INTRAVENOUS SOLUTION, PIGGYBACK (ML) INTRAVENOUS ONCE
Status: COMPLETED | OUTPATIENT
Start: 2024-01-10 | End: 2024-01-10

## 2024-01-10 RX ORDER — PROPOFOL 10 MG/ML
INJECTION, EMULSION INTRAVENOUS AS NEEDED
Status: DISCONTINUED | OUTPATIENT
Start: 2024-01-10 | End: 2024-01-10 | Stop reason: SURG

## 2024-01-10 RX ORDER — IPRATROPIUM BROMIDE AND ALBUTEROL SULFATE 2.5; .5 MG/3ML; MG/3ML
3 SOLUTION RESPIRATORY (INHALATION) ONCE AS NEEDED
Status: DISCONTINUED | OUTPATIENT
Start: 2024-01-10 | End: 2024-01-10 | Stop reason: HOSPADM

## 2024-01-10 RX ORDER — MAGNESIUM HYDROXIDE 1200 MG/15ML
LIQUID ORAL AS NEEDED
Status: DISCONTINUED | OUTPATIENT
Start: 2024-01-10 | End: 2024-01-10 | Stop reason: HOSPADM

## 2024-01-10 RX ORDER — LIDOCAINE HYDROCHLORIDE 20 MG/ML
INJECTION, SOLUTION EPIDURAL; INFILTRATION; INTRACAUDAL; PERINEURAL AS NEEDED
Status: DISCONTINUED | OUTPATIENT
Start: 2024-01-10 | End: 2024-01-10 | Stop reason: SURG

## 2024-01-10 RX ORDER — MAGNESIUM SULFATE HEPTAHYDRATE 500 MG/ML
INJECTION, SOLUTION INTRAMUSCULAR; INTRAVENOUS AS NEEDED
Status: DISCONTINUED | OUTPATIENT
Start: 2024-01-10 | End: 2024-01-10 | Stop reason: SURG

## 2024-01-10 RX ORDER — GABAPENTIN 300 MG/1
300 CAPSULE ORAL ONCE
Status: COMPLETED | OUTPATIENT
Start: 2024-01-10 | End: 2024-01-10

## 2024-01-10 RX ORDER — SODIUM CHLORIDE, SODIUM LACTATE, POTASSIUM CHLORIDE, CALCIUM CHLORIDE 600; 310; 30; 20 MG/100ML; MG/100ML; MG/100ML; MG/100ML
100 INJECTION, SOLUTION INTRAVENOUS CONTINUOUS
Status: DISCONTINUED | OUTPATIENT
Start: 2024-01-10 | End: 2024-01-10 | Stop reason: HOSPADM

## 2024-01-10 RX ORDER — METOCLOPRAMIDE HYDROCHLORIDE 5 MG/ML
10 INJECTION INTRAMUSCULAR; INTRAVENOUS ONCE
Status: COMPLETED | OUTPATIENT
Start: 2024-01-10 | End: 2024-01-10

## 2024-01-10 RX ORDER — DIPHENHYDRAMINE HYDROCHLORIDE 50 MG/ML
25 INJECTION INTRAMUSCULAR; INTRAVENOUS EVERY 4 HOURS PRN
Status: CANCELLED | OUTPATIENT
Start: 2024-01-10

## 2024-01-10 RX ORDER — CYANOCOBALAMIN 1000 UG/ML
1000 INJECTION, SOLUTION INTRAMUSCULAR; SUBCUTANEOUS ONCE
Status: COMPLETED | OUTPATIENT
Start: 2024-01-10 | End: 2024-01-10

## 2024-01-10 RX ORDER — FENTANYL CITRATE 50 UG/ML
INJECTION, SOLUTION INTRAMUSCULAR; INTRAVENOUS AS NEEDED
Status: DISCONTINUED | OUTPATIENT
Start: 2024-01-10 | End: 2024-01-10 | Stop reason: SURG

## 2024-01-10 RX ORDER — LABETALOL HYDROCHLORIDE 5 MG/ML
5 INJECTION, SOLUTION INTRAVENOUS
Status: DISCONTINUED | OUTPATIENT
Start: 2024-01-10 | End: 2024-01-10 | Stop reason: HOSPADM

## 2024-01-10 RX ORDER — TRAMADOL HYDROCHLORIDE 50 MG/1
50 TABLET ORAL EVERY 6 HOURS PRN
Qty: 12 TABLET | Refills: 0 | Status: SHIPPED | OUTPATIENT
Start: 2024-01-10

## 2024-01-10 RX ORDER — SODIUM CHLORIDE 9 MG/ML
40 INJECTION, SOLUTION INTRAVENOUS AS NEEDED
Status: DISCONTINUED | OUTPATIENT
Start: 2024-01-10 | End: 2024-01-10 | Stop reason: HOSPADM

## 2024-01-10 RX ORDER — CHLORHEXIDINE GLUCONATE ORAL RINSE 1.2 MG/ML
15 SOLUTION DENTAL SEE ADMIN INSTRUCTIONS
Status: COMPLETED | OUTPATIENT
Start: 2024-01-10 | End: 2024-01-10

## 2024-01-10 RX ORDER — ACETAMINOPHEN 10 MG/ML
INJECTION, SOLUTION INTRAVENOUS AS NEEDED
Status: DISCONTINUED | OUTPATIENT
Start: 2024-01-10 | End: 2024-01-10 | Stop reason: SURG

## 2024-01-10 RX ORDER — SODIUM CHLORIDE 0.9 % (FLUSH) 0.9 %
3-10 SYRINGE (ML) INJECTION AS NEEDED
Status: DISCONTINUED | OUTPATIENT
Start: 2024-01-10 | End: 2024-01-10 | Stop reason: HOSPADM

## 2024-01-10 RX ORDER — DIPHENHYDRAMINE HYDROCHLORIDE 50 MG/ML
12.5 INJECTION INTRAMUSCULAR; INTRAVENOUS
Status: DISCONTINUED | OUTPATIENT
Start: 2024-01-10 | End: 2024-01-10 | Stop reason: HOSPADM

## 2024-01-10 RX ORDER — NALOXONE HCL 0.4 MG/ML
0.2 VIAL (ML) INJECTION AS NEEDED
Status: DISCONTINUED | OUTPATIENT
Start: 2024-01-10 | End: 2024-01-10 | Stop reason: HOSPADM

## 2024-01-10 RX ORDER — SODIUM CHLORIDE, SODIUM LACTATE, POTASSIUM CHLORIDE, CALCIUM CHLORIDE 600; 310; 30; 20 MG/100ML; MG/100ML; MG/100ML; MG/100ML
9 INJECTION, SOLUTION INTRAVENOUS CONTINUOUS
Status: DISCONTINUED | OUTPATIENT
Start: 2024-01-10 | End: 2024-01-10 | Stop reason: HOSPADM

## 2024-01-10 RX ORDER — HYDRALAZINE HYDROCHLORIDE 20 MG/ML
5 INJECTION INTRAMUSCULAR; INTRAVENOUS
Status: DISCONTINUED | OUTPATIENT
Start: 2024-01-10 | End: 2024-01-10 | Stop reason: HOSPADM

## 2024-01-10 RX ORDER — MIDAZOLAM HYDROCHLORIDE 1 MG/ML
1 INJECTION INTRAMUSCULAR; INTRAVENOUS
Status: DISCONTINUED | OUTPATIENT
Start: 2024-01-10 | End: 2024-01-10 | Stop reason: HOSPADM

## 2024-01-10 RX ORDER — EPHEDRINE SULFATE 50 MG/ML
5 INJECTION, SOLUTION INTRAVENOUS ONCE AS NEEDED
Status: DISCONTINUED | OUTPATIENT
Start: 2024-01-10 | End: 2024-01-10 | Stop reason: HOSPADM

## 2024-01-10 RX ORDER — PANTOPRAZOLE SODIUM 40 MG/10ML
40 INJECTION, POWDER, LYOPHILIZED, FOR SOLUTION INTRAVENOUS ONCE
Status: COMPLETED | OUTPATIENT
Start: 2024-01-10 | End: 2024-01-10

## 2024-01-10 RX ORDER — ENOXAPARIN SODIUM 100 MG/ML
40 INJECTION SUBCUTANEOUS ONCE
Status: COMPLETED | OUTPATIENT
Start: 2024-01-10 | End: 2024-01-10

## 2024-01-10 RX ORDER — PROCHLORPERAZINE EDISYLATE 5 MG/ML
10 INJECTION INTRAMUSCULAR; INTRAVENOUS AS NEEDED
Status: DISCONTINUED | OUTPATIENT
Start: 2024-01-10 | End: 2024-01-10 | Stop reason: HOSPADM

## 2024-01-10 RX ORDER — SCOLOPAMINE TRANSDERMAL SYSTEM 1 MG/1
1 PATCH, EXTENDED RELEASE TRANSDERMAL CONTINUOUS
Status: DISCONTINUED | OUTPATIENT
Start: 2024-01-10 | End: 2024-01-10 | Stop reason: HOSPADM

## 2024-01-10 RX ORDER — FENTANYL CITRATE 50 UG/ML
50 INJECTION, SOLUTION INTRAMUSCULAR; INTRAVENOUS
Status: DISCONTINUED | OUTPATIENT
Start: 2024-01-10 | End: 2024-01-10 | Stop reason: HOSPADM

## 2024-01-10 RX ORDER — KETAMINE HCL IN NACL, ISO-OSM 100MG/10ML
SYRINGE (ML) INJECTION AS NEEDED
Status: DISCONTINUED | OUTPATIENT
Start: 2024-01-10 | End: 2024-01-10 | Stop reason: SURG

## 2024-01-10 RX ORDER — HYDROMORPHONE HYDROCHLORIDE 1 MG/ML
0.5 INJECTION, SOLUTION INTRAMUSCULAR; INTRAVENOUS; SUBCUTANEOUS
Status: DISCONTINUED | OUTPATIENT
Start: 2024-01-10 | End: 2024-01-10 | Stop reason: HOSPADM

## 2024-01-10 RX ORDER — ROCURONIUM BROMIDE 10 MG/ML
INJECTION, SOLUTION INTRAVENOUS AS NEEDED
Status: DISCONTINUED | OUTPATIENT
Start: 2024-01-10 | End: 2024-01-10 | Stop reason: SURG

## 2024-01-10 RX ORDER — OXYCODONE AND ACETAMINOPHEN 7.5; 325 MG/1; MG/1
1 TABLET ORAL EVERY 4 HOURS PRN
Status: DISCONTINUED | OUTPATIENT
Start: 2024-01-10 | End: 2024-01-10 | Stop reason: HOSPADM

## 2024-01-10 RX ORDER — FLUMAZENIL 0.1 MG/ML
0.2 INJECTION INTRAVENOUS AS NEEDED
Status: DISCONTINUED | OUTPATIENT
Start: 2024-01-10 | End: 2024-01-10 | Stop reason: HOSPADM

## 2024-01-10 RX ORDER — THIAMINE HYDROCHLORIDE 100 MG/ML
100 INJECTION, SOLUTION INTRAMUSCULAR; INTRAVENOUS ONCE
Status: COMPLETED | OUTPATIENT
Start: 2024-01-10 | End: 2024-01-10

## 2024-01-10 RX ORDER — ONDANSETRON 2 MG/ML
4 INJECTION INTRAMUSCULAR; INTRAVENOUS ONCE AS NEEDED
Status: DISCONTINUED | OUTPATIENT
Start: 2024-01-10 | End: 2024-01-10 | Stop reason: HOSPADM

## 2024-01-10 RX ADMIN — SCOPALAMINE 1 PATCH: 1 PATCH, EXTENDED RELEASE TRANSDERMAL at 07:12

## 2024-01-10 RX ADMIN — ROCURONIUM BROMIDE 30 MG: 10 INJECTION, SOLUTION INTRAVENOUS at 08:26

## 2024-01-10 RX ADMIN — FENTANYL CITRATE 50 MCG: 50 INJECTION, SOLUTION INTRAMUSCULAR; INTRAVENOUS at 08:51

## 2024-01-10 RX ADMIN — FENTANYL CITRATE 50 MCG: 50 INJECTION, SOLUTION INTRAMUSCULAR; INTRAVENOUS at 08:58

## 2024-01-10 RX ADMIN — DEXAMETHASONE SODIUM PHOSPHATE 12 MG: 4 INJECTION, SOLUTION INTRAMUSCULAR; INTRAVENOUS at 08:33

## 2024-01-10 RX ADMIN — ENOXAPARIN SODIUM 40 MG: 100 INJECTION SUBCUTANEOUS at 10:02

## 2024-01-10 RX ADMIN — SODIUM CHLORIDE, POTASSIUM CHLORIDE, SODIUM LACTATE AND CALCIUM CHLORIDE: 600; 310; 30; 20 INJECTION, SOLUTION INTRAVENOUS at 08:46

## 2024-01-10 RX ADMIN — FENTANYL CITRATE 50 MCG: 50 INJECTION, SOLUTION INTRAMUSCULAR; INTRAVENOUS at 10:00

## 2024-01-10 RX ADMIN — 0.12% CHLORHEXIDINE GLUCONATE 15 ML: 1.2 RINSE ORAL at 07:13

## 2024-01-10 RX ADMIN — MAGNESIUM SULFATE HEPTAHYDRATE 2 G: 500 INJECTION, SOLUTION INTRAMUSCULAR; INTRAVENOUS at 08:33

## 2024-01-10 RX ADMIN — FENTANYL CITRATE 50 MCG: 50 INJECTION, SOLUTION INTRAMUSCULAR; INTRAVENOUS at 08:33

## 2024-01-10 RX ADMIN — ROCURONIUM BROMIDE 20 MG: 10 INJECTION, SOLUTION INTRAVENOUS at 09:12

## 2024-01-10 RX ADMIN — GLYCOPYRROLATE 0.2 MG: 0.2 INJECTION INTRAMUSCULAR; INTRAVENOUS at 08:33

## 2024-01-10 RX ADMIN — ROCURONIUM BROMIDE 50 MG: 10 INJECTION, SOLUTION INTRAVENOUS at 08:24

## 2024-01-10 RX ADMIN — PROPOFOL 200 MCG/KG/MIN: 10 INJECTION, EMULSION INTRAVENOUS at 08:25

## 2024-01-10 RX ADMIN — CEFAZOLIN 3 G: 10 INJECTION, POWDER, FOR SOLUTION INTRAVENOUS at 08:12

## 2024-01-10 RX ADMIN — METOCLOPRAMIDE 10 MG: 5 INJECTION, SOLUTION INTRAMUSCULAR; INTRAVENOUS at 07:13

## 2024-01-10 RX ADMIN — ONDANSETRON 4 MG: 2 INJECTION INTRAMUSCULAR; INTRAVENOUS at 08:33

## 2024-01-10 RX ADMIN — GABAPENTIN 300 MG: 300 CAPSULE ORAL at 07:13

## 2024-01-10 RX ADMIN — PROPOFOL 250 MG: 10 INJECTION, EMULSION INTRAVENOUS at 08:24

## 2024-01-10 RX ADMIN — SUGAMMADEX 300 MG: 100 INJECTION, SOLUTION INTRAVENOUS at 09:28

## 2024-01-10 RX ADMIN — Medication 30 MG: at 08:33

## 2024-01-10 RX ADMIN — THIAMINE HYDROCHLORIDE 100 MG: 100 INJECTION, SOLUTION INTRAMUSCULAR; INTRAVENOUS at 10:02

## 2024-01-10 RX ADMIN — CYANOCOBALAMIN 1000 MCG: 1000 INJECTION, SOLUTION INTRAMUSCULAR; SUBCUTANEOUS at 10:02

## 2024-01-10 RX ADMIN — ACETAMINOPHEN 1000 MG: 10 INJECTION, SOLUTION INTRAVENOUS at 09:18

## 2024-01-10 RX ADMIN — SODIUM CHLORIDE, POTASSIUM CHLORIDE, SODIUM LACTATE AND CALCIUM CHLORIDE 500 ML: 600; 310; 30; 20 INJECTION, SOLUTION INTRAVENOUS at 07:13

## 2024-01-10 RX ADMIN — PANTOPRAZOLE SODIUM 40 MG: 40 INJECTION, POWDER, FOR SOLUTION INTRAVENOUS at 07:13

## 2024-01-10 RX ADMIN — LIDOCAINE HYDROCHLORIDE 100 MG: 20 INJECTION, SOLUTION EPIDURAL; INFILTRATION; INTRACAUDAL; PERINEURAL at 08:23

## 2024-01-10 NOTE — ANESTHESIA PREPROCEDURE EVALUATION
Anesthesia Evaluation     no history of anesthetic complications:   NPO Solid Status: > 8 hours  NPO Liquid Status: > 2 hours           Airway   Mallampati: II  Neck ROM: full  no difficulty expected  Comment: Full beard  Dental - normal exam     Pulmonary - negative pulmonary ROS and normal exam   (+) a smoker Former,shortness of breath  (-) COPD, asthma, sleep apnea    PE comment: nonlabored  Cardiovascular - negative cardio ROS and normal exam  Exercise tolerance: good (4-7 METS)    Rhythm: regular  Rate: normal    (+) hypertension, GAMBOA  (-) valvular problems/murmurs, past MI, CAD, dysrhythmias, angina      Neuro/Psych- negative ROS  (+) psychiatric history Depression  (-) seizures, TIA, CVA  GI/Hepatic/Renal/Endo - negative ROS   (+) morbid obesity  (-) GERD, liver disease, no renal disease, diabetes, no thyroid disorder    Musculoskeletal     (+) arthralgias, back pain, chronic pain  Abdominal    Substance History      OB/GYN          Other                      Anesthesia Plan    ASA 3     general   total IV anesthesia  intravenous induction     Anesthetic plan, risks, benefits, and alternatives have been provided, discussed and informed consent has been obtained with: patient.    CODE STATUS:

## 2024-01-10 NOTE — OP NOTE
PREOPERATIVE DIAGNOSIS:  Morbid obesity with multiple comorbidities as referenced in the most recent history and physical.    POSTOPERATIVE DIAGNOSIS:  Morbid obesity with multiple comorbidities as referenced in the most recent history and physical.    PROCEDURES PERFORMED:  1.  Robotic assisted laparoscopic sleeve gastrectomy with Titan Stapler # [id number]    SURGEON:  Kemal Munson Jr. [facility]    ASSISTANT: LUCY Amaya      Surgery assisted and facilitated by a certified physician assistant, who directly resulted in a decreased operative time, anesthetic time, wound exposure, and possibly of an operative wound infection, thereby decreasing patient morbidity and ultimately total expenditures.  The surgical assistant assisted in placement of trochars, take down of the gastrocolic omentum, short gastric vessels and dissection at the angle of His.  Also assisted in retraction of the stomach during stapling so as not to kink the gastric sleeve.  Also assisted in removing of the gastric specimen, closure of the fascial defect as well as closure of the skin incisions.    ANESTHESIA:  General endotracheal and laparoscopic TAP block with Ropivacaine mixture    ESTIMATED BLOOD LOSS:   Less than 25 mL unless dictated below.    FLUIDS:  Crystalloids.    SPECIMENS:  Gastric remnant    DRAINS:  None.    COUNTS:  Correct.    COMPLICATIONS:  None.    INDICATIONS:  This patient with morbid obesity and associated comorbidities presents for elective laparoscopic,robotic, possible open sleeve gastrectomy.  The patient has received medical clearance to proceed.  The patient has undergone our extensive educational process and consent process and wishes to proceed.    DESCRIPTION OF PROCEDURE:  The patient was brought to the operating room and placed supine upon the operating room table. SCD hose were placed.  The patient underwent uneventful general endotracheal anesthesia per the anesthesiology staff. The abdomen was prepped  with ChloraPrep and draped in the usual sterile fashion. Anesthesia staff passed a 38-Ugandan bougie into the stomach to decompress the stomach and then was pulled back to the esophagus.      An 8 mm transverse incision was made just to the left of midline.  The peritoneal cavity was entered under direct camera visualization using a 5  mm 0° laparoscope and an Optiview trocar.  The abdomen was then insufflated to a pressure of 8-12 mmHg with CO2 gas with AirSeal.  Exploratory laparoscopy revealed no evidence of injury from the entrance technique and no significant abnormalities unless addendum dictated below.  An angled laparoscope was then used.  The patient was placed in reverse Trendelenburg position.  Under direct camera visualization two 8 mm trocar was placed in the left lateral midabdominal position.  A 12 mm trocar was placed in the right abdomen.  A Marta retractor was placed through an epigastric incision and used to elevate the left lobe of the liver.      Next the 30 degree 8 mm scope was brought into the 8 mm port just to the left of the umbilicus after the corresponding trocar was docked to the da Roe robot.  Targeting then took place and then the rest of the trochars were docked to the robot and angled into position.  Instruments were brought in through the trochars in place for laparoscopic view.       I then took control of the surgical console. At this point, approximately long-term along the greater curvature, the gastrocolic omentum was divided with the Vessel Sealer and this proceeded superiorly to the angle of His taking down the short gastric vessels.  All posterior attachments of the lesser sac and posterior aspect of the stomach to the pancreas were taken down as well.      Dissection then proceeded medially taking down the greater curvature with the vessel sealer to just proximal to the pylorus.  The 38-Ugandan bougie was passed back down into the stomach to aid in sizing the sleeve.        The right 12 mm intuitive trocar was removed and replaced with the 19 mm trocar.  The stomach was marked with indelible ink 1 cm from the esophagus, 3 cm at the incisura and approximately 4 to 5 cm from the pylorus.  The Titan stapler was advanced into the abdomen and placed into position and used to create the gastric sleeve.  The stapler was then removed after firing.    Insufflation of the stomach under water was performed and there was no evidence of leak.    Tisseel was then sprayed on the staple line.    The specimen was removed through the 19 mm port site.  The liver retractor was removed. The fascia at the 19 mm trocar site incision that was used for extraction was closed with a single 0 Vicryl suture in a figure-of-eight fashion placed under direct laparoscopic camera visualization with a suture passer and tying the knot extracorporeally.  The fascia in the area was infiltrated with local anesthesia. All incisions were then infiltrated with local anesthetic. The remaining trocars were removed under direct camera visualization with no bleeding noted from their sites.  The abdomen was desufflated of gas. The skin in each incision was closed using 4-0 antibiotic impregnated Monocryl in a subcuticular fashion followed by Dermabond.  The patient tolerated the procedure well without complication and was taken to the recovery room in stable condition.  All sponge, needle and instrument counts were correct.     The hiatus was checked for a hernia and no hernia was detected

## 2024-01-10 NOTE — DISCHARGE INSTRUCTIONS
GOING HOME AFTER GASTRIC SLEEVE/ GASTRIC BYPASS SURGERY  [facility] Weight Loss: Post-Operative Information/Instructions  Kemal Munson Jr., MD  General Patient Instructions for Discharge   - Call Surgeon's office at [phone] for follow-up appointment.    - Be sure you, the patient, have a follow-up appointment to be seen within seven (7) days after discharge. If not, please call [phone] to schedule an appointment. If you are discharged on a Saturday or Sunday, please call Monday to schedule the appointment.  - Contact the Surgeon at [phone] for any questions or concerns, including temperature greater than or equal to 101F, shortness of breath, leg swelling, redness at incision sites, nausea, vomiting, chills, or problems or questions.    - Follow the Gastric Stage 1 Diet    à Clear liquids, room temperature, sugar-free, caffeine-free, non-carbonated, 70 grams of protein, No Straws.  - You may shower. No tub bath for 2 weeks.  - No lifting, pushing, pulling, or tugging >25 pounds for 3 weeks.  - Ambulate every 3 hours while awake minimum for seven (7) days, increase distance daily.  - For the next several weeks, you are at an increased risk for blood clot formation. Therefore, you should walk regularly. You should not sit for prolonged periods of time, more than 45 minutes, without getting up and walking for 5-10 minutes. This includes any car rides, including the drive home from the hospital. If driving any distance greater than 30 miles over the next two (2) weeks, stop every 30-45 minutes and walk for 5-10 minutes each time.  - Continue using Incentive Spirometer and coughing exercises at least every two (2) hours while awake for one week.  - Continue use of CPAP/BIPAP for diagnosis of sleep apnea as directed.  - No driving or operating machinery allowed while taking narcotic (prescription) pain medication, and until you feel comfortable forcefully applying the brakes if needed. (This  usually takes more than 3 days.)    - Make an appointment with your Primary Care Physician within one week post-op to look at your home medications for possible changes or discontinuity.   Medications  - The nurse will provide a list of medications for you to continue at home   - If you received a Lovenox (Enoxaparin) or Apixiban (Eliquis) prescription at pre-op visit with Surgeon, start taking the medicine the morning after discharge unless directed otherwise.    - If you were prescribed Lovenox (Enoxaparin), review the education/teaching material/video with the nurse.    - Take post op pain meds as prescribed as needed.   - Continue Foltx until finished.   - Resume use of Actigall (Ursodiol) one (1) week after surgery if patient still has gallbladder. You should have been given a prescription at your pre-op visit. Contact the office if you do not have the prescription.   - Resume bariatric vitamin regimen as instructed in pre-op education with bariatric coordinator.    - Zegerid or Prilosec OTC (or generic) by mouth once daily for four (4) weeks unless you are already taking a proton pump inhibitor as home medication. Follow dosing instructions on package.   Nausea/Vomiting:  The following are possible causes for nausea/vomiting:  - Drinking too much or too fast.  - Sinus drainage/post nasal drip for allergy sufferers (you may take Sudafed, Claritin, Tylenol Sinus/Allergy, or other decongestants and nose sprays to help with this discomfort).  - Low blood sugar (sweating, shaky, irritable, weakness, dizzy or tunnel-vision) - treatment is to sip 100% fruit juice - no sugar added until symptoms subside.  - Acid in fruit juice - (may dilute with water or avoid).  - Eating or drinking something that is not on clear liquid (stage 1) diet.  Any nausea/vomiting that prohibits you from keeping fluids down for greater than 24 hours requires a call to the surgeon's office.  Urine:  Use your urine color as a guide to  determine if you are drinking enough fluid. The darker the urine, the more fluids you need to drink. Urine should be clear to light yellow if you are getting enough fluid. If you should experience frequency, burning or pain with urination, blood in urine, contact us or your primary care physician for possible UTI (urinary tract infection), which could require antibiotics (liquid preferred).  Bowel Movements:  You may not have a bowel movement for 2-5 days after going home. You may then experience liquid, runny or loose stools for approximately 3-4 weeks following surgery. This would require you to drink even more fluids to prevent dehydration. Some patients may experience constipation, which can be treated with increased fluids, drinking warm liquids, increased activity and the use of a Fleets Enema, Milk of Magnesia, or suppositories. The first couple of bowel movements could be bloody, tarry black or dark maroon in color. This is OK as long as the stool returns to a normal color in 1-2 days. If however, you have frequent or a large amount of bloody or tarry black stools and/or become light-headed or dizzy, you may be bleeding and require urgent attention. Please call us right away.  Abdominal Incisions:  You will have small incisions. Do not scrub incisions, but allow the warm, soapy water to run over the incisions, rinse well, and pat dry. You may use any brand of anti-bacterial soap. Do not use Peroxide or Neosporin type ointments on sites, unless instructed to do so by a surgeon or nurse. Monitor daily for signs/symptoms of infection, which might include: drainage with a foul odor, pain, redness, swelling or heat at the incision sight; fever, body aches and chills. If you suspect infection or have a fever, give us a call.  Pain:  You will be given a prescription for pain medication to control your pain. If you feel the dose is too strong, you may take half the ordered dose, or you may take Tylenol adult liquid  per package instructions for minor pain. Do not take any medications that contain aspirin or aspirin products.  Do not take medications like: Motrin, Aleve, Ibuprofen, Advil, Naproxen, Celebrex, Daypro, Bextra, Meloxicam or other medications commonly used for arthritis or joint pain.  No steroids or cortisone injections. There may be pain, which should improve every few days. Pain should not suddenly get worse or more intense. Pain that suddenly changes and is constant and severe should be called in to the surgeon's office. Any sudden pain in the lower extremities with associated warmth and redness should be called in to the surgeon's office immediately. Do not rub or massage this area, as it could be a blood clot.  Diet:  Remain on the clear liquid diet (stage 1) per your  which includes 70 grams of protein each day, sugar free, non carbonated and no straws. Day 1 is the day of surgery. If you are tolerating the stage 1 diet, you may then proceed to stage 2 diet, as instructed in the . Do not progress to the stage 2 diet if you are having nausea/vomiting. Refer to the Basic Nutrition and Food Principles guide.  Medications:  The nurse will let you know which medications you will need to continue once you go home. Do not take any medications that are extended or time released if you had the gastric bypass procedure, OK to take if you had the gastric sleeve procedure. Large capsules can be opened and diluted with clear liquids. Check with your physician or pharmacist as to which pills may be crushed and which capsules may be opened and diluted safely. Continue taking Foltx as surgeon orders. If you still have your gall bladder and were prescribed Actigall (Ursodiol), you may resume this medication one week after your surgery. You will remain on Actigall (Ursodiol) for approximately 6 months. The dose is 1 pill, 2 times each day for 6 months.  Activity:  Continue your deep breathing and  coughing exercises with your Incentive Spirometer breathing device at least every 3 hours while awake (10 repetitions each time) for one week. May use CPAP. This will help to prevent respiratory problems such as pneumonia. No lifting, pulling or tugging anything over 25 pounds for 3 weeks after surgery. You may shower but no tub baths, hot tubs or swimming for 2 weeks. Moderate walking is recommended every 3 hours while awake minimum, increase distance daily. Further exercise will be discussed at the first post-op visit. No driving or operating machinery allow until off narcotic pain medication and until you feel comfortable forcefully applying the brakes (usually takes 3 or more days). For the next few weeks you are at an increased risk for blood clot formation. Therefore you should walk regularly and you should not sit for prolonged periods of time, more than 45 minutes without getting up and walking for 5-10 minutes. This includes car rides. Including riding home from the hospital. If riding a distance greater than 30 miles over the next 2 weeks stop every 30-45 minutes and walk 5-10 mintues each time. No tanning bed use for 8 weeks after surgery and in general, not recommended due to the increased risk for skin cancer. Incisions will burn/blister very badly with tanning bed use.  Illness:  Your primary care physician should treat general illness such as ear infections, sinus infections, and viral type illnesses, etc. Medications prescribed should be liquid/elixir form when possible, for the first 30 days.  General:  In general, it is recommended that you weigh yourself no more than once per week. Let the weight come off you and concentrate on more important things. Remember the weight was not gained overnight, nor will it be lost overnight. Gastric Bypass/ Gastric Sleeve weight loss will continue over a period of 12-18 months. Do not  yourself according to how others are doing after surgery, as this will  cause unnecessary discouragement.  THE ABOVE ARE GENERAL GUIDELINES TO ASSIST YOU ONCE HOME, IF YOU ARE IN DOUBT, OR YOU HAVE ANY QUESTIONS, CALL US AT THE NUMBERS LISTED BELOW.  IN THE EVENT OF SUDDEN CHEST PAIN, SHORTNESS OF BREATH, OR ANY LIFE THREATENING CONDITION, CALL 911.  Any time you are evaluated or admitted to another facility, please have someone notify the surgeon's office.  Supplements:  70 grams of protein taken EVERY DAY. Remember to drink at least 64 ounces of fluid a day, sipping slowly early on. Increase this amount during the summertime. Sipping slowly will not stretch your new stomach. Drinking too fast or gulping liquids will cause brief discomfort and early could cause staple line disruption (leak). With eating, tiny bites, then chew, chew, chew, and swallow. Lay your fork/spoon down for 2-3 minutes, and then take your next bite. Your pouch will tell you within 1-2 bites if it is going to tolerate what you are eating.   Protein Vendors:  Refer to protein vendors' handout from consult class. You can always find protein drinks at the bariatric office, grocery stores, [employer], drug [employer], [employer], health food stores, and on the Internet. Find one high in protein (15-30 grams per serving) and low carb (less than 18 grams per serving).  Now is a great time to re-read your . Please review specific instructions given to you at discharge by your physician (surgeon).  HOW/WHEN TO CONTACT US:  It is imperative that you contact us with any of the following:    Ÿ fever greater than 101 degrees  Ÿ shortness of breath  Ÿ leg swelling  Ÿ body aches  Ÿ shaking chills  Ÿ nausea and vomitting  Ÿ pain that has worsened  Ÿ redness at incision sites  Ÿ pus or foul smelling drainage from an incision or wound  Ÿ inability to keep fluids down for more than a day  Ÿ any other condition you feel needs our attention.  [facility] - Bariatric: [phone] call this number anytime 24 hours a  day / 7 days a week.  Teach-back Questions to be answered by the patient prior to discharge.   What complications would prompt you to call your doctor when you return home? _________________    What is the purpose of your prescribed medication? ________________  What are some potential side effects of the medications you will be taking at home? _______________

## 2024-01-10 NOTE — ANESTHESIA PROCEDURE NOTES
Airway  Urgency: elective    Date/Time: 1/10/2024 8:29 AM  Airway not difficult    General Information and Staff    Patient location during procedure: OR  CRNA/CAA: Israel Sawant CRNA    Indications and Patient Condition  Indications for airway management: airway protection    Preoxygenated: yes  Mask difficulty assessment: 2 - vent by mask + OA or adjuvant +/- NMBA    Final Airway Details  Final airway type: endotracheal airway      Successful airway: ETT  Cuffed: yes   Successful intubation technique: direct laryngoscopy  Facilitating devices/methods: intubating stylet  Endotracheal tube insertion site: oral  Blade: Michelle  Blade size: 3  ETT size (mm): 7.5  Cormack-Lehane Classification: grade IIa - partial view of glottis  Placement verified by: chest auscultation and capnometry   Measured from: teeth  ETT/EBT  to teeth (cm): 22  Number of attempts at approach: 1  Assessment: lips, teeth, and gum same as pre-op and atraumatic intubation

## 2024-01-10 NOTE — ANESTHESIA POSTPROCEDURE EVALUATION
Patient: Mark Keller    Procedure Summary       Date: 01/10/24 Room / Location:  [facility]  [facility]    Anesthesia Start: 0816 Anesthesia Stop: 0944    Procedure: GASTRIC SLEEVE LAPAROSCOPIC With Davinci ROBOT (Abdomen) Diagnosis:       Obesity, Class III, BMI 40-49.9 (morbid obesity)      (Obesity, Class III, BMI 40-49.9 (morbid obesity) [E66.01])    Surgeons: Kemal Munson Jr., MD Provider: Fabian Melendez MD    Anesthesia Type: general ASA Status: 3            Anesthesia Type: general    Vitals  Vitals Value Taken Time   /65 01/10/24 1030   Temp 36.4 °C (97.6 °F) 01/10/24 0941   Pulse 79 01/10/24 1041   Resp 18 01/10/24 1030   SpO2 96 % 01/10/24 1041   Vitals shown include unfiled device data.        Post Anesthesia Care and Evaluation    Patient location during evaluation: PHASE II  Patient participation: complete - patient participated  Level of consciousness: awake  Pain management: satisfactory to patient    Airway patency: patent  Anesthetic complications: No anesthetic complications  PONV Status: controlled  Cardiovascular status: acceptable  Respiratory status: acceptable  Hydration status: acceptable

## 2024-01-11 ENCOUNTER — TRANSITIONAL CARE MANAGEMENT TELEPHONE ENCOUNTER (OUTPATIENT)
Dept: CALL CENTER | Facility: HOSPITAL | Age: 29
End: 2024-01-11
Payer: COMMERCIAL

## 2024-01-11 ENCOUNTER — READMISSION MANAGEMENT (OUTPATIENT)
Dept: CALL CENTER | Facility: HOSPITAL | Age: 29
End: 2024-01-11
Payer: COMMERCIAL

## 2024-01-11 LAB
LAB AP CASE REPORT: NORMAL
PATH REPORT.FINAL DX SPEC: NORMAL
PATH REPORT.GROSS SPEC: NORMAL

## 2024-01-11 NOTE — OUTREACH NOTE
Prep Survey      Flowsheet Row Responses   [facility] facility patient discharged from? [facility]   Is LACE score < 7 ? Yes   [facility] GASTRIC SLEEVE LAPAROSCOPIC With Davinci ROBOT   Date of Admission 01/10/24   Date of Discharge 01/10/24   Discharge Disposition Home or Self Care   Does the patient have one of the following disease processes/diagnoses(primary or secondary)? General Surgery   Does the patient have Home health ordered? No   Is there a DME ordered? No   Prep survey completed? Yes            Judy JIMENEZ - Registered Nurse

## 2024-01-11 NOTE — OUTREACH NOTE
Call Center TCM Note      Flowsheet Row Responses   [facility] patient discharged from? [facility]   Does the patient have one of the following disease processes/diagnoses(primary or secondary)? General Surgery   TCM attempt successful? Yes  [NO VR]   Call start time 1014   Call end time 1016   Meds reviewed with patient/caregiver? Yes   Is the patient having any side effects they believe may be caused by any medication additions or changes? No   Does the patient have all medications related to this admission filled (includes all antibiotics, pain medications, etc.) Yes   Is the patient taking all medications as directed (includes completed medication regime)? Yes   Comments HOSP DC FU appt 1/15/24 10 am   Does the patient have an appointment with their PCP within 7-14 days of discharge? Yes   Has home health visited the patient within 72 hours of discharge? N/A   Psychosocial issues? No   Did the patient receive a copy of their discharge instructions? Yes   Nursing interventions Reviewed instructions with patient   What is the patient's perception of their health status since discharge? Improving   Nursing interventions Nurse provided patient education   Is the patient /caregiver able to teach back basic post-op care? Continue use of incentive spirometry at least 1 week post discharge, Lifting as instructed by MD in discharge instructions, Take showers only when approved by MD-sponge bathe until then, No tub bath, swimming, or hot tub until instructed by MD, Drive as instructed by MD in discharge instructions   Is the patient/caregiver able to teach back signs and symptoms of incisional infection? Increased redness, swelling or pain at the incisonal site, Increased drainage or bleeding, Incisional warmth, Pus or odor from incision, Fever   Is the patient/caregiver able to teach back steps to recovery at home? Set small, achievable goals for return to baseline health   If the patient is a current smoker, are  they able to teach back resources for cessation? Not a smoker   Is the patient/caregiver able to teach back the hierarchy of who to call/visit for symptoms/problems? PCP, Specialist, Home health nurse, Urgent Care, ED, 911 Yes   TCM call completed? Yes   Wrap up additional comments Pt reports he is doing ok. Pt aware of restrictions and diet.   Call end time 1016            Gema Saxena RN    1/11/2024, 10:17 EST

## 2024-01-15 ENCOUNTER — OFFICE VISIT (OUTPATIENT)
Dept: FAMILY MEDICINE CLINIC | Facility: CLINIC | Age: 29
End: 2024-01-15
Payer: COMMERCIAL

## 2024-01-15 VITALS
BODY MASS INDEX: 43.16 KG/M2 | HEIGHT: 68 IN | HEART RATE: 80 BPM | DIASTOLIC BLOOD PRESSURE: 82 MMHG | SYSTOLIC BLOOD PRESSURE: 122 MMHG | OXYGEN SATURATION: 97 % | WEIGHT: 284.8 LBS

## 2024-01-15 DIAGNOSIS — Z90.3 S/P GASTRIC SLEEVE PROCEDURE: Primary | ICD-10-CM

## 2024-01-15 DIAGNOSIS — Z09 HOSPITAL DISCHARGE FOLLOW-UP: ICD-10-CM

## 2024-01-15 PROCEDURE — 99213 OFFICE O/P EST LOW 20 MIN: CPT | Performed by: NURSE PRACTITIONER

## 2024-01-15 NOTE — PROGRESS NOTES
Transitional Care Follow Up Visit  Subjective     Mark Keller is a 28 y.o. male who presents for a transitional care management visit.    Within 48 business hours after discharge our office contacted him via telephone to coordinate his care and needs.      I reviewed and discussed the details of that call along with the discharge summary, hospital problems, inpatient lab results, inpatient diagnostic studies, and consultation reports with Mark.     Current outpatient and discharge medications have been reconciled for the patient.  Reviewed by: Aracelis Santiago, LUCY          1/11/2024     5:10 AM   Date of TCM Phone Call   [facility] GASTRIC SLEEVE LAPAROSCOPIC With Davinci ROBOT   Date of Admission 1/10/2024   Date of Discharge 1/10/2024   Discharge Disposition Home or Self Care     Risk for Readmission (LACE) No data recorded    History of Present Illness  Presents to follow up on gastric sleeve placement on 1/10/24. Reports that he is doing well. He does have gas, muscle pain on the lower right side. Has a rash on his upper abdomen post op. He reports he is moving his bowels without any difficulty.      Course During Hospital Stay:  stable     The following portions of the patient's history were reviewed and updated as appropriate: allergies, current medications, past family history, past medical history, past social history, past surgical history, and problem list.      Current Outpatient Medications:     Calcium 500-2.5 MG-MCG chewable tablet, Chew 2 tablets Daily., Disp: , Rfl:     fluticasone (FLONASE) 50 MCG/ACT nasal spray, 1 spray into the nostril(s) as directed by provider Daily As Needed., Disp: , Rfl:     folic acid-vit B6-vit B12 (FOLBEE) 2.5-25-1 MG tablet tablet, Take 1 tablet by mouth Daily., Disp: 40 tablet, Rfl: 0    omeprazole (priLOSEC) 20 MG capsule, Take 1 capsule by mouth Daily., Disp: , Rfl:     ondansetron ODT (ZOFRAN-ODT) 4 MG disintegrating tablet, Place 1 tablet on the tongue Every 4  (Four) Hours As Needed for Nausea or Vomiting., Disp: 30 tablet, Rfl: 0    traMADol (Ultram) 50 MG tablet, Take 1 tablet by mouth Every 6 (Six) Hours As Needed for Moderate Pain., Disp: 12 tablet, Rfl: 0    ursodiol (Actigall) 300 MG capsule, Take 1 capsule by mouth 2 (Two) Times a Day. (Patient not taking: Reported on 1/15/2024), Disp: 60 capsule, Rfl: 5       Review of Systems   Constitutional: Negative.    Respiratory: Negative.     Cardiovascular: Negative.    Gastrointestinal:  Positive for abdominal pain (post op pain on right side).        Gas   Genitourinary: Negative.    Neurological: Negative.    Psychiatric/Behavioral: Negative.         Objective   Physical Exam  Vitals reviewed.   Cardiovascular:      Rate and Rhythm: Normal rate and regular rhythm.      Pulses: Normal pulses.      Heart sounds: Normal heart sounds.   Pulmonary:      Effort: Pulmonary effort is normal.      Breath sounds: Normal breath sounds.   Skin:     General: Skin is warm and dry.      Findings: Rash (fine rash upper abdomen) present.   Neurological:      Mental Status: He is alert and oriented to person, place, and time.   Psychiatric:      Comments: No acute distress         Assessment & Plan   Diagnoses and all orders for this visit:    1. S/P gastric sleeve procedure (Primary)    2. Hospital discharge follow-up    Mr. Keller appears to be doing well today.  We have reviewed and reconciled his discharge medications.  He is to continue all medications as prescribed.  He is to follow up with bariatrics as scheduled.  He has been instructed to appley the medication that was given to him by dermatology to his rash.

## 2024-01-16 ENCOUNTER — OFFICE VISIT (OUTPATIENT)
Dept: BARIATRICS/WEIGHT MGMT | Facility: CLINIC | Age: 29
End: 2024-01-16
Payer: COMMERCIAL

## 2024-01-16 VITALS
HEIGHT: 69 IN | WEIGHT: 283 LBS | SYSTOLIC BLOOD PRESSURE: 130 MMHG | DIASTOLIC BLOOD PRESSURE: 74 MMHG | HEART RATE: 80 BPM | TEMPERATURE: 97.3 F | BODY MASS INDEX: 41.92 KG/M2

## 2024-01-16 DIAGNOSIS — Z71.3 DIETARY COUNSELING: ICD-10-CM

## 2024-01-16 DIAGNOSIS — E66.01 OBESITY, CLASS III, BMI 40-49.9 (MORBID OBESITY): Primary | ICD-10-CM

## 2024-01-16 DIAGNOSIS — Z98.84 S/P LAPAROSCOPIC SLEEVE GASTRECTOMY: ICD-10-CM

## 2024-01-16 PROCEDURE — 99024 POSTOP FOLLOW-UP VISIT: CPT | Performed by: NURSE PRACTITIONER

## 2024-01-16 RX ORDER — NYSTATIN 100000 [USP'U]/G
POWDER TOPICAL 3 TIMES DAILY
Qty: 60 G | Refills: 3 | Status: SHIPPED | OUTPATIENT
Start: 2024-01-16

## 2024-01-16 NOTE — PROGRESS NOTES
[facility] BAR[facility]ATR[facility] BARIATRIC SURGERY  [address] [facility] [address]  [facility] [address]  [phone]  [address] [id number] [phone]  Dept: [phone]  1/16/2024      Mark Keller.  [id number]  [id number]  1995  male      Chief Complaint   Patient presents with    Post-op     6 days PO sleeve       BH Post-Op Bariatric Surgery:   Mark Keller is status post laparopscopic Laparoscopic Sleeve procedure, performed on 1/10/2024.     HPI:   Today's weight is 128 kg (283 lb) pounds, today's BMI is Body mass index is 42.3 kg/m².,HE@ has a  loss of 28 pounds since surgery. The patient reports a decreased portion size and loss of appetite.  Mark Keller denies nausea, vomiting, reflux, dysphagia and reports tolerating . The patient c/o appropriate post-op incisional discomfort that is improving. he is doing well with protein and water intake so far. Taking their vitamins, walking and using IS. Denies fevers, chills, chest pain or shortness of air.     Diet and Exercise: Diet history reviewed and discussed with the patient. Weight loss/gains to date discussed with the patient. No carbonated beverage consumption and exercising regularly- walking frequently.   Supplements: multivitamins, B-12, calcium, iron, B-1 and Vitamin D.   Patient is taking blood thinner as prescribed: Lovenox  Current outpatient and discharge medications have been reconciled for the patient.  Reviewed by: LUCY Preciado    Review of Systems   Constitutional:  Positive for activity change, appetite change and fatigue.   Respiratory:  Negative for shortness of breath.    Cardiovascular:  Negative for chest pain.   All other systems reviewed and are negative.      Patient Active Problem List   Diagnosis    Chronic fatigue    Essential hypertension    Snoring    Dyspnea    Heartburn    Chronic low back pain    Chronic knee pain    Dietary counseling    Obesity, Class  III, BMI 40-49.9 (morbid obesity)    S/P laparoscopic sleeve gastrectomy       The following portions of the patient's history were reviewed and updated as appropriate: allergies, current medications, past medical history, past surgical history, and problem list.    Vitals:    01/16/24 1102   BP: 130/74   Pulse: 80   Temp: 97.3 °F (36.3 °C)       Physical Exam  Vitals reviewed.   Constitutional:       General: He is awake. He is not in acute distress.     Appearance: He is morbidly obese.   HENT:      Head: Normocephalic and atraumatic.      Mouth/Throat:      Mouth: Mucous membranes are moist.   Eyes:      General: No scleral icterus.     Extraocular Movements: Extraocular movements intact.      Conjunctiva/sclera: Conjunctivae normal.      Pupils: Pupils are equal, round, and reactive to light.   Cardiovascular:      Rate and Rhythm: Normal rate and regular rhythm.   Pulmonary:      Effort: Pulmonary effort is normal. No respiratory distress.   Abdominal:      General: Abdomen is flat. Bowel sounds are normal. There is no distension.      Palpations: Abdomen is soft.      Tenderness: There is no abdominal tenderness. There is no guarding.      Comments: Incisions clean, dry, intact; no erythema   Musculoskeletal:         General: Normal range of motion.      Cervical back: Normal range of motion and neck supple.   Skin:     General: Skin is warm and dry.      Comments: Macular rash on abdomen   Neurological:      General: No focal deficit present.      Mental Status: He is alert and oriented to person, place, and time.   Psychiatric:         Mood and Affect: Mood normal.         Behavior: Behavior normal. Behavior is cooperative.         Thought Content: Thought content normal.         Judgment: Judgment normal.         Assessment:   Post-op, the patient is doing well.     Encounter Diagnoses   Name Primary?    Obesity, Class III, BMI 40-49.9 (morbid obesity) Yes    S/P laparoscopic sleeve gastrectomy     Dietary  counseling        Plan:   Stop lovenox  Reviewed with patient the importance of following the manual for diet progression. Increase activity as tolerated. Continue increasing daily intake of protein and water.   Return to work: the patient is to return to 3 weeks from their surgery date with no restrictions unless they develop medical problems in which we will see them back in the office. They received a note in our office today with their return to work date.  Activity restrictions: no lifting, pushing or pulling over 25lbs for 3 weeks.   Recommended patient be sure to get at least 70 grams of protein per day. Discussed with the patient the recommended amount of water per day to intake. Reviewed vitamin requirements. Be sure to do routine exercise and increase activity as tolerated. No asa, nsaids or steroids for 8 weeks if gastric sleeve procedure and lifelong if gastric bypass procedure.. Patient may use miralax as needed if necessary.     Instructions / Recommendations: dietary counseling recommended, recommended a daily protein intake of  grams, vitamin supplement(s) recommended, recommended exercising at least 150 minutes per week, behavior modifications recommended and instructed to call the office for concerns, questions, or problems.     The patient was instructed to follow up at one month follow up appt.     The patient was counseled regarding post op bariatric manual

## 2024-02-08 ENCOUNTER — OFFICE VISIT (OUTPATIENT)
Dept: BARIATRICS/WEIGHT MGMT | Facility: CLINIC | Age: 29
End: 2024-02-08
Payer: COMMERCIAL

## 2024-02-08 VITALS
TEMPERATURE: 98 F | WEIGHT: 265 LBS | DIASTOLIC BLOOD PRESSURE: 76 MMHG | HEIGHT: 69 IN | BODY MASS INDEX: 39.25 KG/M2 | HEART RATE: 64 BPM | SYSTOLIC BLOOD PRESSURE: 108 MMHG

## 2024-02-08 DIAGNOSIS — Z71.3 DIETARY COUNSELING: ICD-10-CM

## 2024-02-08 DIAGNOSIS — I10 ESSENTIAL HYPERTENSION: ICD-10-CM

## 2024-02-08 DIAGNOSIS — E66.9 OBESITY, CLASS II, BMI 35-39.9: Primary | ICD-10-CM

## 2024-02-08 DIAGNOSIS — Z98.84 S/P LAPAROSCOPIC SLEEVE GASTRECTOMY: ICD-10-CM

## 2024-02-08 PROBLEM — E66.813 OBESITY, CLASS III, BMI 40-49.9 (MORBID OBESITY): Status: RESOLVED | Noted: 2023-12-21 | Resolved: 2024-02-08

## 2024-02-08 PROBLEM — E66.812 OBESITY, CLASS II, BMI 35-39.9: Status: ACTIVE | Noted: 2024-02-08

## 2024-02-08 PROBLEM — E66.01 OBESITY, CLASS III, BMI 40-49.9 (MORBID OBESITY): Status: RESOLVED | Noted: 2023-12-21 | Resolved: 2024-02-08

## 2024-02-08 PROCEDURE — 99024 POSTOP FOLLOW-UP VISIT: CPT | Performed by: NURSE PRACTITIONER

## 2024-02-08 NOTE — PROGRESS NOTES
[facility] BARIATR[facility] BARIATRIC SURGERY  [address] [address]  [phone]  [address] [id number] [phone]  Dept: [phone]  2/8/2024      Mark Keller.  [id number]  [id number]  1995  male      Chief Complaint   Patient presents with   • Post-op     1 month post op sleeve       BH Post-Op Bariatric Surgery:   Mark Keller is status post Laparoscopic Sleeve procedure, performed on 1/10/24     HPI:   Today's weight is 120 kg (265 lb) pounds, today's BMI is Body mass index is 39.61 kg/m²., a  loss of 18 pounds since the last visit and weight loss since surgery is 46 pounds.    Mark Keller denies n/v/d/regurg/reflux/pain and reports doing well with diet advancement     Diet and Exercise: Diet history reviewed and discussed with the patient. Weight loss/gains to date discussed with the patient. The patient states they are eating around 60+ grams of protein per day. He reports eating 4 meals per day, a typical portion size of <1 cup, eating 1-2 snacks per day, drinking 5+ or more 8-oz. glasses of water per day, no carbonated beverage consumption and exercising regularly- cardio 5 times a week.     Supplements: OTC multivitamin with iron.     Review of Systems   All other systems reviewed and are negative.      * No active hospital problems. *      Past Medical History:   Diagnosis Date   • Chronic knee pain    • Chronic low back pain    • Heartburn    • History of depression     PRE TEEN/TEENAGE YEARS   • Hypertension     OFF AND ON, NEVER ON MEDS   • Obesity, Class III, BMI 40-49.9 (morbid obesity)        The following portions of the patient's history were reviewed and updated as appropriate: allergies, current medications, past family history, past medical history, past social history, past surgical history, and problem list.    Vitals:    02/08/24 1102   BP: 108/76   Pulse: 64   Temp: 98 °F (36.7 °C)        Physical Exam  Vitals reviewed.   Constitutional:       Appearance: Normal appearance. He is well-developed. He is obese.   HENT:      Head: Normocephalic and atraumatic.   Cardiovascular:      Rate and Rhythm: Normal rate.   Pulmonary:      Effort: Pulmonary effort is normal.      Breath sounds: Normal breath sounds.   Abdominal:      General: Bowel sounds are normal. There is no distension.      Palpations: Abdomen is soft.      Tenderness: There is no abdominal tenderness.   Musculoskeletal:         General: Normal range of motion.   Skin:     General: Skin is warm and dry.   Neurological:      Mental Status: He is alert and oriented to person, place, and time.   Psychiatric:         Behavior: Behavior normal.         Thought Content: Thought content normal.         Judgment: Judgment normal.     Assessment:   Post-op, the patient is doing well.     Encounter Diagnoses   Name Primary?   • Obesity, Class II, BMI 35-39.9 Yes   • S/P laparoscopic sleeve gastrectomy    • Dietary counseling    • Essential hypertension        Plan:     Encouraged patient to be sure to get plenty of lean protein per day through small meals all with a protein source. Continue with diet advancement per the manual. Increase exercise as tolerated. Reviewed weight loss goals and time frame. Will add strength training/.    Activity restrictions: none.   Recommended patient be sure to get at least 70 grams of protein per day by eating small  meals all with high lean protein choices. Be sure to limit/cut back on daily carbohydrate intake. Discussed with the patient the recommended amount of water per day to intake- half of body weight in ounces. Reviewed vitamin requirements. Be sure to do routine exercise, 150 minutes per week minimum, including both cardio and strength training.     Instructions / Recommendations: dietary counseling recommended, recommended a daily protein intake of  grams, vitamin supplement(s) recommended,  recommended exercising at least 150 minutes per week, behavior modifications recommended and instructed to call the office for concerns, questions, or problems.     The patient was instructed to follow up in 2 months.     The patient was counseled regarding the above procedure.  The majority of time was spent counseling the patient regarding diet and exercise as well as reviewing their medications and their compliance with the procedure.

## 2024-02-23 ENCOUNTER — TELEPHONE (OUTPATIENT)
Dept: BARIATRICS/WEIGHT MGMT | Facility: CLINIC | Age: 29
End: 2024-02-23
Payer: COMMERCIAL

## 2024-02-23 NOTE — TELEPHONE ENCOUNTER
Patient called to consult on new medications prescribed by other provider. Flexeril and medrol, I told him the flexeril was fine, but the Medrol, considering that is an oral steroid, and that patient is only 5 weeks post op from gastric sleeve surgery, I advised him to put it on hold for at least another week or two, if there was another alternative. Patient told me his pain was not as bad when resting, and that the doctor offered him Tylenol as an alternative, advised him to try that first.

## 2024-04-16 ENCOUNTER — TELEPHONE (OUTPATIENT)
Dept: BARIATRICS/WEIGHT MGMT | Facility: CLINIC | Age: 29
End: 2024-04-16
Payer: COMMERCIAL

## 2024-04-22 ENCOUNTER — TELEPHONE (OUTPATIENT)
Dept: FAMILY MEDICINE CLINIC | Facility: CLINIC | Age: 29
End: 2024-04-22
Payer: COMMERCIAL

## 2024-04-22 NOTE — TELEPHONE ENCOUNTER
LVM appt on 7-15-24 @ 11/45 am has been moved to Tuesday, 7-23-24 @ 11:45 am as Aracelis Santiago is out on vacation.  Call if any issued with new date and time.

## 2024-09-20 ENCOUNTER — TELEPHONE (OUTPATIENT)
Dept: FAMILY MEDICINE CLINIC | Facility: CLINIC | Age: 29
End: 2024-09-20

## 2024-09-20 ENCOUNTER — OFFICE VISIT (OUTPATIENT)
Dept: FAMILY MEDICINE CLINIC | Facility: CLINIC | Age: 29
End: 2024-09-20
Payer: COMMERCIAL

## 2024-09-20 VITALS
DIASTOLIC BLOOD PRESSURE: 78 MMHG | OXYGEN SATURATION: 99 % | HEIGHT: 69 IN | WEIGHT: 236.4 LBS | SYSTOLIC BLOOD PRESSURE: 116 MMHG | BODY MASS INDEX: 35.01 KG/M2 | HEART RATE: 68 BPM

## 2024-09-20 DIAGNOSIS — S22.49XD CLOSED FRACTURE OF MULTIPLE RIBS WITH ROUTINE HEALING, UNSPECIFIED LATERALITY, SUBSEQUENT ENCOUNTER: Primary | ICD-10-CM
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
## 2024-09-20 PROCEDURE — 99213 OFFICE O/P EST LOW 20 MIN: CPT | Performed by: NURSE PRACTITIONER

## 2024-09-20 RX ORDER — CYCLOBENZAPRINE HCL 10 MG
10 TABLET ORAL
COMMUNITY
Start: 2024-04-17 | End: 2024-09-20

## 2024-09-20 RX ORDER — HYDROCODONE BITARTRATE AND ACETAMINOPHEN 5; 325 MG/1; MG/1
TABLET ORAL
COMMUNITY
Start: 2024-09-20

## 2024-09-20 RX ORDER — OMEGA-3 FATTY ACIDS/FISH OIL 300-1000MG
CAPSULE ORAL
COMMUNITY

## 2024-11-01 ENCOUNTER — TELEPHONE (OUTPATIENT)
Dept: FAMILY MEDICINE CLINIC | Facility: CLINIC | Age: 29
End: 2024-11-01
Payer: COMMERCIAL
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
## (undated) DEVICE — BALN BOUGIE STD/TPR 38F

## (undated) DEVICE — APL DUPLOSPRAYER MIS 40CM

## (undated) DEVICE — NDL HYPO ECLPS SFTY 21G 1 1/2IN

## (undated) DEVICE — CONN TBG Y 5 IN 1 LF STRL

## (undated) DEVICE — SOL NACL 0.9PCT 1000ML

## (undated) DEVICE — GLV SURG SENSICARE PI MIC PF SZ6 LF STRL

## (undated) DEVICE — 2, DISPOSABLE SUCTION/IRRIGATOR WITH DISPOSABLE TIP: Brand: STRYKEFLOW

## (undated) DEVICE — LOU GENERAL ROBOT: Brand: MEDLINE INDUSTRIES, INC.

## (undated) DEVICE — GLV SURG SENSICARE PI PF LF 8.5 GRN STRL

## (undated) DEVICE — GOWN,NON-REINFORCED,SIRUS,SET IN SLV,XL: Brand: MEDLINE

## (undated) DEVICE — GLV SURG SENSICARE POLYISPRN W/ALOE PF LF 6.5 GRN STRL

## (undated) DEVICE — ARM DRAPE

## (undated) DEVICE — SYR LUERLOK 20CC BX/50

## (undated) DEVICE — THE DEVICE IS A DISPOSABLE, LIGATURE PASSING, SUTURING APPARATUS AND NEEDLE GUIDE FOR THE ABDOMINAL WALL WHICH IS NON-POWERED, HAND-HELD, AND HAND-MANIPULATED,INTENDED TO BE USED IN VARIOUS GENERAL SURGICAL PROCEDURES. THE DEVICE INCLUDES A LIGATURE CARRIER PATHWAY, NEEDLE GUIDE, TWO NEEDLES, REFERENCE PLANE T-BAR, AND A GUIDEWIRE. THE HANDLE OF THE DEVICE PROVIDES TWO DIAMETRICALLY OPPOSED ENCLOSED GUIDEWAYS FOR THE ADVANCEMENT AND RETRACTION OF THE NEEDLES UNDER MANUAL CONTROL OF A PLUNGER LOCATED AT THE PROXIMAL END OF THE DEVICE.AS PART OF THE M-CLOSE CONVENIENCE KIT, A NERVE BLOCK NEEDLE IS INCLUDED FOR THE ADMINISTRATION OF LOCAL ANESTHETIC AGENTS TO PROVIDE REGIONAL AND LOCAL ANESTHESIA.  A TELFA ANTIMICROBIAL, NON-ADHERENT PAD IS ALSO PROVIDED IN THE KIT FOR USE AS A PRIMARY DRESSING FOR THE SURGICAL INCISION.: Brand: M-CLOSE KIT

## (undated) DEVICE — VIOLET BRAIDED (POLYGLACTIN 910), SYNTHETIC ABSORBABLE SUTURE: Brand: COATED VICRYL

## (undated) DEVICE — ENDOPATH XCEL WITH OPTIVIEW TECHNOLOGY BLADELESS TROCARS WITH STABILITY SLEEVES: Brand: ENDOPATH XCEL OPTIVIEW

## (undated) DEVICE — DECANTER BAG 9": Brand: MEDLINE INDUSTRIES, INC.

## (undated) DEVICE — GLV SURG SENSICARE PI MIC PF SZ8.5 LF STRL

## (undated) DEVICE — DRAPE,UTILITY,TAPE,15X26,STERILE: Brand: MEDLINE

## (undated) DEVICE — BLADELESS OBTURATOR: Brand: WECK VISTA

## (undated) DEVICE — KIT,ANTI FOG,W/SPONGE & FLUID,SOFT PACK: Brand: MEDLINE

## (undated) DEVICE — SEAL

## (undated) DEVICE — VESSEL SEALER EXTEND: Brand: ENDOWRIST

## (undated) DEVICE — IRRIGATOR BULB ASEPTO 60CC STRL

## (undated) DEVICE — SUT VIC 0 CT1 27IN DYED J340H

## (undated) DEVICE — SEALANT WND FIBRIN TISSEEL PREFIL/SYR/PRIMAFZ 2ML

## (undated) DEVICE — SOL IRR SALINE 0.9% 100ML STRL

## (undated) DEVICE — LAPAROSCOPIC DISSECTOR: Brand: DEROYAL

## (undated) DEVICE — TROC STANDARDTROCAR FOR/TITANSGS 19MM DISP STRL

## (undated) DEVICE — SUT SILK 0 SH 30IN K834H

## (undated) DEVICE — SUT MNCRYL PLS ANTIB UD 4/0 PS2 18IN

## (undated) DEVICE — ST TBG AIRSEAL BIF FLTR W/ACT/CHARCOAL/FLTR

## (undated) DEVICE — MARKER,SKIN,WI/RULER AND LABELS: Brand: MEDLINE

## (undated) DEVICE — PATIENT RETURN ELECTRODE, SINGLE-USE, CONTACT QUALITY MONITORING, ADULT, WITH 9FT CORD, FOR PATIENTS WEIGING OVER 33LBS. (15KG): Brand: MEGADYNE

## (undated) DEVICE — 500ML,PRESSURE INFUSER W/STOPCOCK: Brand: MEDLINE

## (undated) DEVICE — TROC BLADLES AIRSEAL/OPTI THRD 8X120MM 1P/U